# Patient Record
Sex: FEMALE | Race: WHITE | NOT HISPANIC OR LATINO | Employment: OTHER | URBAN - METROPOLITAN AREA
[De-identification: names, ages, dates, MRNs, and addresses within clinical notes are randomized per-mention and may not be internally consistent; named-entity substitution may affect disease eponyms.]

---

## 2023-02-09 PROBLEM — I50.30 DIASTOLIC CHF (MULTI): Status: ACTIVE | Noted: 2023-02-09

## 2023-02-09 PROBLEM — R30.0 DYSURIA: Status: ACTIVE | Noted: 2023-02-09

## 2023-02-09 PROBLEM — M24.811 INTERNAL DERANGEMENT OF RIGHT SHOULDER: Status: ACTIVE | Noted: 2023-02-09

## 2023-02-09 PROBLEM — I51.7 DILATED VENTRICLE: Status: ACTIVE | Noted: 2023-02-09

## 2023-02-09 PROBLEM — R53.83 FATIGUE: Status: ACTIVE | Noted: 2023-02-09

## 2023-02-09 PROBLEM — E66.812 CLASS 2 OBESITY WITH BODY MASS INDEX (BMI) OF 37.0 TO 37.9 IN ADULT: Status: ACTIVE | Noted: 2023-02-09

## 2023-02-09 PROBLEM — K83.8 INTRAHEPATIC BILE DUCT DILATION: Status: ACTIVE | Noted: 2023-02-09

## 2023-02-09 PROBLEM — N39.0 ACUTE UTI: Status: ACTIVE | Noted: 2023-02-09

## 2023-02-09 PROBLEM — K64.9 HEMORRHOIDS: Status: ACTIVE | Noted: 2023-02-09

## 2023-02-09 PROBLEM — L25.9 DERMATITIS VENENATA: Status: RESOLVED | Noted: 2023-02-09 | Resolved: 2023-02-09

## 2023-02-09 PROBLEM — I67.4 HYPERTENSIVE ENCEPHALOPATHY: Status: ACTIVE | Noted: 2023-02-09

## 2023-02-09 PROBLEM — R19.7 DIARRHEA: Status: ACTIVE | Noted: 2023-02-09

## 2023-02-09 PROBLEM — L23.5 ALLERGIC CONTACT DERMATITIS DUE TO CHEMICAL: Status: ACTIVE | Noted: 2023-02-09

## 2023-02-09 PROBLEM — R15.9 BOWEL INCONTINENCE: Status: ACTIVE | Noted: 2023-02-09

## 2023-02-09 PROBLEM — G47.00 INSOMNIA: Status: ACTIVE | Noted: 2023-02-09

## 2023-02-09 PROBLEM — R11.2 NAUSEA AND VOMITING: Status: ACTIVE | Noted: 2023-02-09

## 2023-02-09 PROBLEM — M17.11 OSTEOARTHRITIS OF RIGHT KNEE: Status: ACTIVE | Noted: 2023-02-09

## 2023-02-09 PROBLEM — H81.10 BENIGN POSITIONAL VERTIGO: Status: ACTIVE | Noted: 2023-02-09

## 2023-02-09 PROBLEM — E78.5 HYPERLIPIDEMIA: Status: ACTIVE | Noted: 2023-02-09

## 2023-02-09 PROBLEM — L30.9 ECZEMA: Status: ACTIVE | Noted: 2023-02-09

## 2023-02-09 PROBLEM — I48.91 ATRIAL FIBRILLATION (MULTI): Status: ACTIVE | Noted: 2023-02-09

## 2023-02-09 PROBLEM — K59.00 CONSTIPATION: Status: ACTIVE | Noted: 2023-02-09

## 2023-02-09 PROBLEM — R10.9 ACUTE ABDOMINAL PAIN: Status: ACTIVE | Noted: 2023-02-09

## 2023-02-09 PROBLEM — D64.9 ANEMIA: Status: ACTIVE | Noted: 2023-02-09

## 2023-02-09 PROBLEM — M60.9 MYOSITIS: Status: ACTIVE | Noted: 2023-02-09

## 2023-02-09 PROBLEM — S41.119A SKIN TEAR OF UPPER EXTREMITY: Status: ACTIVE | Noted: 2023-02-09

## 2023-02-09 PROBLEM — K43.0 INCARCERATED INCISIONAL HERNIA: Status: ACTIVE | Noted: 2023-02-09

## 2023-02-09 PROBLEM — B37.9 YEAST INFECTION: Status: ACTIVE | Noted: 2023-02-09

## 2023-02-09 PROBLEM — R11.0 NAUSEA: Status: ACTIVE | Noted: 2023-02-09

## 2023-02-09 PROBLEM — M50.30 DDD (DEGENERATIVE DISC DISEASE), CERVICAL: Status: ACTIVE | Noted: 2023-02-09

## 2023-02-09 PROBLEM — J01.90 ACUTE SINUSITIS: Status: ACTIVE | Noted: 2023-02-09

## 2023-02-09 PROBLEM — I10 ESSENTIAL HYPERTENSION: Status: ACTIVE | Noted: 2023-02-09

## 2023-02-09 PROBLEM — N39.0 UTI (URINARY TRACT INFECTION): Status: ACTIVE | Noted: 2023-02-09

## 2023-02-09 PROBLEM — K62.5 RECTAL BLEEDING: Status: ACTIVE | Noted: 2023-02-09
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
## 2023-02-09 PROBLEM — M25.562 LEFT KNEE PAIN: Status: ACTIVE | Noted: 2023-02-09

## 2023-02-09 PROBLEM — R10.30 LOWER ABDOMINAL PAIN: Status: ACTIVE | Noted: 2023-02-09

## 2023-02-09 PROBLEM — E66.9 CLASS 2 OBESITY WITH BODY MASS INDEX (BMI) OF 37.0 TO 37.9 IN ADULT: Status: ACTIVE | Noted: 2023-02-09

## 2023-02-09 PROBLEM — L25.9 DERMATITIS VENENATA: Status: ACTIVE | Noted: 2023-02-09

## 2023-02-09 PROBLEM — E66.01 MORBID OBESITY (MULTI): Status: ACTIVE | Noted: 2023-02-09

## 2023-02-09 PROBLEM — S49.91XA RIGHT SHOULDER INJURY: Status: ACTIVE | Noted: 2023-02-09

## 2023-02-09 PROBLEM — K57.92 DVTRCLI OF INTEST, PART UNSP, W/O PERF OR ABSCESS W/O BLEED: Status: ACTIVE | Noted: 2023-02-09

## 2023-02-09 PROBLEM — M75.02 ADHESIVE CAPSULITIS OF LEFT SHOULDER: Status: ACTIVE | Noted: 2023-02-09

## 2023-02-09 PROBLEM — R06.2 WHEEZING ON EXPIRATION: Status: ACTIVE | Noted: 2023-02-09

## 2023-02-09 PROBLEM — R05.9 COUGH: Status: ACTIVE | Noted: 2023-02-09

## 2023-02-09 PROBLEM — I10 HYPERTENSION: Status: ACTIVE | Noted: 2023-02-09

## 2023-02-09 PROBLEM — R60.0 LOWER LEG EDEMA: Status: ACTIVE | Noted: 2023-02-09

## 2023-02-09 PROBLEM — L30.4 INTERTRIGO: Status: ACTIVE | Noted: 2023-02-09

## 2023-02-09 PROBLEM — R42 LIGHTHEADEDNESS: Status: ACTIVE | Noted: 2023-02-09

## 2023-02-09 PROBLEM — K56.609 SBO (SMALL BOWEL OBSTRUCTION) (MULTI): Status: ACTIVE | Noted: 2023-02-09

## 2023-02-09 PROBLEM — R94.31 ABNORMAL EKG: Status: ACTIVE | Noted: 2023-02-09

## 2023-02-09 PROBLEM — M47.812 DJD (DEGENERATIVE JOINT DISEASE) OF CERVICAL SPINE: Status: ACTIVE | Noted: 2023-02-09

## 2023-02-09 PROBLEM — M15.9 GENERALIZED OSTEOARTHRITIS: Status: ACTIVE | Noted: 2023-02-09

## 2023-02-09 PROBLEM — B96.89 ACUTE BRONCHITIS, BACTERIAL: Status: ACTIVE | Noted: 2023-02-09

## 2023-02-09 PROBLEM — E66.9 OBESITY (BMI 30-39.9): Status: ACTIVE | Noted: 2023-02-09

## 2023-02-09 PROBLEM — J20.8 ACUTE BRONCHITIS, BACTERIAL: Status: ACTIVE | Noted: 2023-02-09

## 2023-02-09 PROBLEM — K62.3 PARTIAL RECTAL PROLAPSE: Status: ACTIVE | Noted: 2023-02-09

## 2023-02-09 PROBLEM — J44.9 COPD (CHRONIC OBSTRUCTIVE PULMONARY DISEASE) (MULTI): Status: ACTIVE | Noted: 2023-02-09

## 2023-02-09 PROBLEM — J20.9 ACUTE BRONCHITIS: Status: ACTIVE | Noted: 2023-02-09

## 2023-02-09 PROBLEM — R11.10 VOMITING: Status: ACTIVE | Noted: 2023-02-09

## 2023-02-09 PROBLEM — B35.6 TINEA CRURIS: Status: ACTIVE | Noted: 2023-02-09

## 2023-02-09 RX ORDER — PREDNISONE 20 MG/1
20 TABLET ORAL DAILY
COMMUNITY
End: 2023-12-18 | Stop reason: SDUPTHER

## 2023-02-09 RX ORDER — TIZANIDINE 4 MG/1
TABLET ORAL NIGHTLY
COMMUNITY
Start: 2017-06-22 | End: 2024-04-29 | Stop reason: SDUPTHER

## 2023-02-09 RX ORDER — METOPROLOL TARTRATE 25 MG/1
25 TABLET, FILM COATED ORAL 2 TIMES DAILY
COMMUNITY
Start: 2021-09-20 | End: 2023-04-10 | Stop reason: SDUPTHER

## 2023-02-09 RX ORDER — LATANOPROST 50 UG/ML
SOLUTION/ DROPS OPHTHALMIC
COMMUNITY
Start: 2019-04-22

## 2023-02-09 RX ORDER — FUROSEMIDE 20 MG/1
20 TABLET ORAL AS NEEDED
COMMUNITY
Start: 2021-10-15 | End: 2023-09-22 | Stop reason: SDUPTHER

## 2023-02-09 RX ORDER — ADHESIVE BANDAGE
BANDAGE TOPICAL
COMMUNITY
Start: 2021-01-19

## 2023-02-09 RX ORDER — OMEPRAZOLE 20 MG/1
20 TABLET, DELAYED RELEASE ORAL DAILY
COMMUNITY

## 2023-02-09 RX ORDER — HYDROCHLOROTHIAZIDE 25 MG/1
25 TABLET ORAL DAILY
COMMUNITY
Start: 2021-12-20 | End: 2023-09-22 | Stop reason: SDUPTHER

## 2023-02-09 RX ORDER — LISINOPRIL 40 MG/1
1 TABLET ORAL DAILY
COMMUNITY
Start: 2012-09-24 | End: 2024-01-15 | Stop reason: SDUPTHER

## 2023-02-09 RX ORDER — ALBUTEROL SULFATE 90 UG/1
2 AEROSOL, METERED RESPIRATORY (INHALATION) EVERY 4 HOURS PRN
COMMUNITY
Start: 2022-03-17

## 2023-02-09 RX ORDER — LACTULOSE 10 G/15ML
10 SOLUTION ORAL; RECTAL EVERY OTHER DAY
COMMUNITY
Start: 2020-08-05 | End: 2024-05-01 | Stop reason: SDUPTHER

## 2023-02-09 RX ORDER — AMLODIPINE BESYLATE 10 MG/1
10 TABLET ORAL DAILY
COMMUNITY
Start: 2019-08-13 | End: 2023-06-13 | Stop reason: SDUPTHER

## 2023-02-09 RX ORDER — TRIAMCINOLONE ACETONIDE 1 MG/G
CREAM TOPICAL 3 TIMES DAILY
COMMUNITY
Start: 2015-07-09 | End: 2023-12-29 | Stop reason: SDUPTHER

## 2023-02-09 RX ORDER — FLUTICASONE FUROATE AND VILANTEROL 100; 25 UG/1; UG/1
1 POWDER RESPIRATORY (INHALATION) DAILY
COMMUNITY

## 2023-02-09 RX ORDER — ROSUVASTATIN CALCIUM 20 MG/1
20 TABLET, COATED ORAL DAILY
COMMUNITY
Start: 2016-05-06 | End: 2023-06-13 | Stop reason: SDUPTHER

## 2023-02-09 RX ORDER — POLYETHYLENE GLYCOL 3350 17 G/17G
POWDER, FOR SOLUTION ORAL
COMMUNITY
Start: 2020-07-30

## 2023-03-22 ENCOUNTER — LAB (OUTPATIENT)
Dept: LAB | Facility: LAB | Age: 83
End: 2023-03-22
Payer: MEDICARE

## 2023-03-22 ENCOUNTER — OFFICE VISIT (OUTPATIENT)
Dept: PRIMARY CARE | Facility: CLINIC | Age: 83
End: 2023-03-22
Payer: MEDICARE

## 2023-03-22 VITALS
BODY MASS INDEX: 37.74 KG/M2 | WEIGHT: 213 LBS | HEART RATE: 84 BPM | OXYGEN SATURATION: 97 % | SYSTOLIC BLOOD PRESSURE: 115 MMHG | HEIGHT: 63 IN | DIASTOLIC BLOOD PRESSURE: 76 MMHG

## 2023-03-22 DIAGNOSIS — R73.9 ELEVATED BLOOD SUGAR: ICD-10-CM

## 2023-03-22 DIAGNOSIS — I48.20 CHRONIC ATRIAL FIBRILLATION (MULTI): ICD-10-CM

## 2023-03-22 DIAGNOSIS — J44.9 CHRONIC OBSTRUCTIVE PULMONARY DISEASE, UNSPECIFIED COPD TYPE (MULTI): ICD-10-CM

## 2023-03-22 DIAGNOSIS — I50.32 CHRONIC DIASTOLIC CONGESTIVE HEART FAILURE (MULTI): ICD-10-CM

## 2023-03-22 DIAGNOSIS — I10 HYPERTENSION, UNSPECIFIED TYPE: ICD-10-CM

## 2023-03-22 DIAGNOSIS — E66.01 MORBID OBESITY (MULTI): ICD-10-CM

## 2023-03-22 DIAGNOSIS — E78.5 HYPERLIPIDEMIA, UNSPECIFIED HYPERLIPIDEMIA TYPE: ICD-10-CM

## 2023-03-22 DIAGNOSIS — I73.9 PERIPHERAL VASCULAR DISEASE (CMS-HCC): Primary | ICD-10-CM

## 2023-03-22 PROBLEM — S41.119A SKIN TEAR OF UPPER EXTREMITY: Status: RESOLVED | Noted: 2023-02-09 | Resolved: 2023-03-22

## 2023-03-22 PROBLEM — K59.00 CONSTIPATION: Status: RESOLVED | Noted: 2023-02-09 | Resolved: 2023-03-22

## 2023-03-22 PROBLEM — M60.9 MYOSITIS: Status: RESOLVED | Noted: 2023-02-09 | Resolved: 2023-03-22

## 2023-03-22 PROBLEM — L30.9 ECZEMA: Status: RESOLVED | Noted: 2023-02-09 | Resolved: 2023-03-22

## 2023-03-22 PROBLEM — G47.00 INSOMNIA: Status: RESOLVED | Noted: 2023-02-09 | Resolved: 2023-03-22

## 2023-03-22 PROBLEM — R11.2 NAUSEA AND VOMITING: Status: RESOLVED | Noted: 2023-02-09 | Resolved: 2023-03-22

## 2023-03-22 PROBLEM — J01.90 ACUTE SINUSITIS: Status: RESOLVED | Noted: 2023-02-09 | Resolved: 2023-03-22

## 2023-03-22 PROBLEM — K56.609 SBO (SMALL BOWEL OBSTRUCTION) (MULTI): Status: RESOLVED | Noted: 2023-02-09 | Resolved: 2023-03-22

## 2023-03-22 PROBLEM — E66.812 CLASS 2 OBESITY WITH BODY MASS INDEX (BMI) OF 37.0 TO 37.9 IN ADULT: Status: RESOLVED | Noted: 2023-02-09 | Resolved: 2023-03-22

## 2023-03-22 PROBLEM — R11.10 VOMITING: Status: RESOLVED | Noted: 2023-02-09 | Resolved: 2023-03-22

## 2023-03-22 PROBLEM — K62.3 PARTIAL RECTAL PROLAPSE: Status: RESOLVED | Noted: 2023-02-09 | Resolved: 2023-03-22

## 2023-03-22 PROBLEM — R15.9 BOWEL INCONTINENCE: Status: RESOLVED | Noted: 2023-02-09 | Resolved: 2023-03-22

## 2023-03-22 PROBLEM — I67.4 HYPERTENSIVE ENCEPHALOPATHY: Status: RESOLVED | Noted: 2023-02-09 | Resolved: 2023-03-22

## 2023-03-22 PROBLEM — R30.0 DYSURIA: Status: RESOLVED | Noted: 2023-02-09 | Resolved: 2023-03-22

## 2023-03-22 PROBLEM — K83.8 INTRAHEPATIC BILE DUCT DILATION: Status: RESOLVED | Noted: 2023-02-09 | Resolved: 2023-03-22

## 2023-03-22 PROBLEM — B37.9 YEAST INFECTION: Status: RESOLVED | Noted: 2023-02-09 | Resolved: 2023-03-22

## 2023-03-22 PROBLEM — R60.0 LOWER LEG EDEMA: Status: RESOLVED | Noted: 2023-02-09 | Resolved: 2023-03-22

## 2023-03-22 PROBLEM — N39.0 ACUTE UTI: Status: RESOLVED | Noted: 2023-02-09 | Resolved: 2023-03-22

## 2023-03-22 PROBLEM — M47.812 DJD (DEGENERATIVE JOINT DISEASE) OF CERVICAL SPINE: Status: RESOLVED | Noted: 2023-02-09 | Resolved: 2023-03-22

## 2023-03-22 PROBLEM — E66.9 CLASS 2 OBESITY WITH BODY MASS INDEX (BMI) OF 37.0 TO 37.9 IN ADULT: Status: RESOLVED | Noted: 2023-02-09 | Resolved: 2023-03-22

## 2023-03-22 PROBLEM — K57.92 DVTRCLI OF INTEST, PART UNSP, W/O PERF OR ABSCESS W/O BLEED: Status: RESOLVED | Noted: 2023-02-09 | Resolved: 2023-03-22

## 2023-03-22 PROBLEM — R06.2 WHEEZING ON EXPIRATION: Status: RESOLVED | Noted: 2023-02-09 | Resolved: 2023-03-22

## 2023-03-22 PROBLEM — L30.4 INTERTRIGO: Status: RESOLVED | Noted: 2023-02-09 | Resolved: 2023-03-22

## 2023-03-22 PROBLEM — R10.9 ACUTE ABDOMINAL PAIN: Status: RESOLVED | Noted: 2023-02-09 | Resolved: 2023-03-22

## 2023-03-22 PROBLEM — H81.10 BENIGN POSITIONAL VERTIGO: Status: RESOLVED | Noted: 2023-02-09 | Resolved: 2023-03-22

## 2023-03-22 PROBLEM — R94.31 ABNORMAL EKG: Status: RESOLVED | Noted: 2023-02-09 | Resolved: 2023-03-22

## 2023-03-22 PROBLEM — B35.6 TINEA CRURIS: Status: RESOLVED | Noted: 2023-02-09 | Resolved: 2023-03-22

## 2023-03-22 PROBLEM — K62.5 RECTAL BLEEDING: Status: RESOLVED | Noted: 2023-02-09 | Resolved: 2023-03-22

## 2023-03-22 PROBLEM — E66.9 OBESITY (BMI 30-39.9): Status: RESOLVED | Noted: 2023-02-09 | Resolved: 2023-03-22

## 2023-03-22 PROBLEM — D64.9 ANEMIA: Status: RESOLVED | Noted: 2023-02-09 | Resolved: 2023-03-22

## 2023-03-22 PROBLEM — R19.7 DIARRHEA: Status: RESOLVED | Noted: 2023-02-09 | Resolved: 2023-03-22

## 2023-03-22 PROBLEM — J20.8 ACUTE BRONCHITIS, BACTERIAL: Status: RESOLVED | Noted: 2023-02-09 | Resolved: 2023-03-22

## 2023-03-22 PROBLEM — S49.91XA RIGHT SHOULDER INJURY: Status: RESOLVED | Noted: 2023-02-09 | Resolved: 2023-03-22

## 2023-03-22 PROBLEM — R42 LIGHTHEADEDNESS: Status: RESOLVED | Noted: 2023-02-09 | Resolved: 2023-03-22

## 2023-03-22 PROBLEM — N39.0 UTI (URINARY TRACT INFECTION): Status: RESOLVED | Noted: 2023-02-09 | Resolved: 2023-03-22

## 2023-03-22 PROBLEM — M25.562 LEFT KNEE PAIN: Status: RESOLVED | Noted: 2023-02-09 | Resolved: 2023-03-22

## 2023-03-22 PROBLEM — R10.30 LOWER ABDOMINAL PAIN: Status: RESOLVED | Noted: 2023-02-09 | Resolved: 2023-03-22

## 2023-03-22 PROBLEM — M24.811 INTERNAL DERANGEMENT OF RIGHT SHOULDER: Status: RESOLVED | Noted: 2023-02-09 | Resolved: 2023-03-22

## 2023-03-22 PROBLEM — J20.9 ACUTE BRONCHITIS: Status: RESOLVED | Noted: 2023-02-09 | Resolved: 2023-03-22

## 2023-03-22 PROBLEM — L23.5 ALLERGIC CONTACT DERMATITIS DUE TO CHEMICAL: Status: RESOLVED | Noted: 2023-02-09 | Resolved: 2023-03-22

## 2023-03-22 PROBLEM — I51.7 DILATED VENTRICLE: Status: RESOLVED | Noted: 2023-02-09 | Resolved: 2023-03-22

## 2023-03-22 PROBLEM — M17.11 OSTEOARTHRITIS OF RIGHT KNEE: Status: RESOLVED | Noted: 2023-02-09 | Resolved: 2023-03-22
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
## 2023-03-22 PROBLEM — B96.89 ACUTE BRONCHITIS, BACTERIAL: Status: RESOLVED | Noted: 2023-02-09 | Resolved: 2023-03-22

## 2023-03-22 PROBLEM — K43.0 INCARCERATED INCISIONAL HERNIA: Status: RESOLVED | Noted: 2023-02-09 | Resolved: 2023-03-22

## 2023-03-22 PROBLEM — R11.0 NAUSEA: Status: RESOLVED | Noted: 2023-02-09 | Resolved: 2023-03-22

## 2023-03-22 PROBLEM — M75.02 ADHESIVE CAPSULITIS OF LEFT SHOULDER: Status: RESOLVED | Noted: 2023-02-09 | Resolved: 2023-03-22

## 2023-03-22 PROBLEM — R05.9 COUGH: Status: RESOLVED | Noted: 2023-02-09 | Resolved: 2023-03-22

## 2023-03-22 PROBLEM — R53.83 FATIGUE: Status: RESOLVED | Noted: 2023-02-09 | Resolved: 2023-03-22

## 2023-03-22 LAB
ALANINE AMINOTRANSFERASE (SGPT) (U/L) IN SER/PLAS: 12 U/L (ref 7–45)
ALBUMIN (G/DL) IN SER/PLAS: 4.4 G/DL (ref 3.4–5)
ALKALINE PHOSPHATASE (U/L) IN SER/PLAS: 78 U/L (ref 33–136)
ANION GAP IN SER/PLAS: 15 MMOL/L (ref 10–20)
ASPARTATE AMINOTRANSFERASE (SGOT) (U/L) IN SER/PLAS: 18 U/L (ref 9–39)
BILIRUBIN TOTAL (MG/DL) IN SER/PLAS: 0.6 MG/DL (ref 0–1.2)
CALCIUM (MG/DL) IN SER/PLAS: 10.2 MG/DL (ref 8.6–10.6)
CARBON DIOXIDE, TOTAL (MMOL/L) IN SER/PLAS: 30 MMOL/L (ref 21–32)
CHLORIDE (MMOL/L) IN SER/PLAS: 101 MMOL/L (ref 98–107)
CHOLESTEROL (MG/DL) IN SER/PLAS: 120 MG/DL (ref 0–199)
CHOLESTEROL IN HDL (MG/DL) IN SER/PLAS: 64.9 MG/DL
CHOLESTEROL/HDL RATIO: 1.8
CREATININE (MG/DL) IN SER/PLAS: 0.84 MG/DL (ref 0.5–1.05)
ERYTHROCYTE DISTRIBUTION WIDTH (RATIO) BY AUTOMATED COUNT: 13.3 % (ref 11.5–14.5)
ERYTHROCYTE MEAN CORPUSCULAR HEMOGLOBIN CONCENTRATION (G/DL) BY AUTOMATED: 31.9 G/DL (ref 32–36)
ERYTHROCYTE MEAN CORPUSCULAR VOLUME (FL) BY AUTOMATED COUNT: 94 FL (ref 80–100)
ERYTHROCYTES (10*6/UL) IN BLOOD BY AUTOMATED COUNT: 3.93 X10E12/L (ref 4–5.2)
ESTIMATED AVERAGE GLUCOSE FOR HBA1C: 105 MG/DL
GFR FEMALE: 69 ML/MIN/1.73M2
GLUCOSE (MG/DL) IN SER/PLAS: 95 MG/DL (ref 74–99)
HEMATOCRIT (%) IN BLOOD BY AUTOMATED COUNT: 37 % (ref 36–46)
HEMOGLOBIN (G/DL) IN BLOOD: 11.8 G/DL (ref 12–16)
HEMOGLOBIN A1C/HEMOGLOBIN TOTAL IN BLOOD: 5.3 %
LDL: 41 MG/DL (ref 0–99)
LEUKOCYTES (10*3/UL) IN BLOOD BY AUTOMATED COUNT: 9.1 X10E9/L (ref 4.4–11.3)
NRBC (PER 100 WBCS) BY AUTOMATED COUNT: 0 /100 WBC (ref 0–0)
PLATELETS (10*3/UL) IN BLOOD AUTOMATED COUNT: 277 X10E9/L (ref 150–450)
POTASSIUM (MMOL/L) IN SER/PLAS: 4.5 MMOL/L (ref 3.5–5.3)
PROTEIN TOTAL: 6.7 G/DL (ref 6.4–8.2)
SODIUM (MMOL/L) IN SER/PLAS: 141 MMOL/L (ref 136–145)
THYROTROPIN (MIU/L) IN SER/PLAS BY DETECTION LIMIT <= 0.05 MIU/L: 1.79 MIU/L (ref 0.44–3.98)
TRIGLYCERIDE (MG/DL) IN SER/PLAS: 70 MG/DL (ref 0–149)
UREA NITROGEN (MG/DL) IN SER/PLAS: 29 MG/DL (ref 6–23)
VLDL: 14 MG/DL (ref 0–40)

## 2023-03-22 PROCEDURE — 36415 COLL VENOUS BLD VENIPUNCTURE: CPT

## 2023-03-22 PROCEDURE — 80053 COMPREHEN METABOLIC PANEL: CPT

## 2023-03-22 PROCEDURE — 84443 ASSAY THYROID STIM HORMONE: CPT

## 2023-03-22 PROCEDURE — 80061 LIPID PANEL: CPT

## 2023-03-22 PROCEDURE — 1160F RVW MEDS BY RX/DR IN RCRD: CPT | Performed by: INTERNAL MEDICINE

## 2023-03-22 PROCEDURE — 3074F SYST BP LT 130 MM HG: CPT | Performed by: INTERNAL MEDICINE

## 2023-03-22 PROCEDURE — 83036 HEMOGLOBIN GLYCOSYLATED A1C: CPT

## 2023-03-22 PROCEDURE — 1159F MED LIST DOCD IN RCRD: CPT | Performed by: INTERNAL MEDICINE

## 2023-03-22 PROCEDURE — 99214 OFFICE O/P EST MOD 30 MIN: CPT | Performed by: INTERNAL MEDICINE

## 2023-03-22 PROCEDURE — 3078F DIAST BP <80 MM HG: CPT | Performed by: INTERNAL MEDICINE

## 2023-03-22 PROCEDURE — 1036F TOBACCO NON-USER: CPT | Performed by: INTERNAL MEDICINE

## 2023-03-22 PROCEDURE — 85027 COMPLETE CBC AUTOMATED: CPT

## 2023-03-22 RX ORDER — FLUTICASONE FUROATE, UMECLIDINIUM BROMIDE AND VILANTEROL TRIFENATATE 100; 62.5; 25 UG/1; UG/1; UG/1
1 POWDER RESPIRATORY (INHALATION) DAILY
Qty: 1 EACH | Refills: 11 | Status: SHIPPED | OUTPATIENT
Start: 2023-03-22 | End: 2024-04-09 | Stop reason: SDUPTHER

## 2023-03-22 NOTE — PROGRESS NOTES
Subjective   Patient ID: Caridad Rodriguez is a 83 y.o. female who presents for the following    Medicare wellness 2022  Assessment/Plan   COPD: spirometry  FEV1 49%, recommend once a day breo    she has a nebulizer at home       diastolic chf : stable, continue 20mg lasix daily and continue on hctz 25mg daily      dry weight is 210lbs, currently patient is 213lbs ( 20mg lasix daily --> TID dosing until 210lbs)     morbid obesity: stable, recommend diet and weight loss     a-fib: continue on xeralto. stable, rate controlled on metoprolol      anemia noted hgb 9-10 check iron studies b12 folate and tsh (she denies any area of bleeding)       HPI   female with hemorrhoids, htn, hld, bilateral tka, hx sbo 2021 & 2022, a-fib (on xarelto), diastolic CHF wants to follow up for         bronchitis vs COPD: patient  doing well. Has as needed inhaler      diastolic chf/ bilateral lower extremity edema: she has currently taken 20mg lasix daily. patient is on lisinopril 40mg daily, amlodipine 10mg daily.       A-fib: patient is now on metoprolol 25mg bid and xarelto 20mg daily. echo 2021 shows normal ef with elevated RVSP. she has mild bruising on her right forearm but no bleeding from xeralto      NO BLEEDING Colonoscopy: Took cologuard at home which was positive, Coloscopy done by Dr. Peña, states she doesn't need another colonoscopy now for the rest of her life     Mammogram: 6-7 year ago, no family hx of breast cancer     patient has great granddaughters      family medical history: both mom and dad  DM and stroke, brother  lung ca      surgery: open incisional hernia repair May 17, 2022 Dr Orona      HOSPITALIZATIONS   [facility] 2022 - 2022 for small bowel obstruction that resolved within the day. months later she had her hernia repaired in May 2022.       [facility]  to 2022 for severe diarrhea. abd/pelvic ct shows SBO. labs show uti. patient improved quicker  "then expected. she is sent with cipro going home and she is to follow up with general surgery for hernia repair    Visit Vitals  /76   Pulse 84   Ht 1.6 m (5' 3\")   Wt 96.6 kg (213 lb)   SpO2 97%   BMI 37.73 kg/m²   Smoking Status Former   BSA 2.07 m²     PHYSICAL EXAM       REVIEW OF SYSTEMS       Allergies   Allergen Reactions    Adhesive Tape-Silicones Unknown    Latex Unknown    Metronidazole Unknown       Current Outpatient Medications   Medication Sig Dispense Refill    albuterol (Ventolin HFA) 90 mcg/actuation inhaler Inhale 2 puffs every 4 (four) hours if needed.      amLODIPine (Norvasc) 10 mg tablet Take 1 tablet (10 mg) by mouth 1 (one) time each day.      aspirin 81 mg capsule Take 1 tablet by mouth 1 (one) time each day.      fluticasone furoate-vilanteroL (Breo Ellipta) 100-25 mcg/dose inhaler Inhale 1 puff in the morning. After inhalation rinse mouth with water and spit. Use same time each day, no more than once in 24 hours.      furosemide (Lasix) 20 mg tablet Take 1 tablet (20 mg) by mouth 1 (one) time each day.      hydroCHLOROthiazide (HYDRODiuril) 25 mg tablet Take 1 tablet (25 mg) by mouth 1 (one) time each day.      lactulose 20 gram/30 mL oral solution Take 15 mL (10 g) by mouth every other day.      latanoprost (Xalatan) 0.005 % ophthalmic solution Latanoprost 0.005 % Ophthalmic Solution   Refills: 0        Start : 22-Apr-2019   Active  2.5 ML Bottle      lisinopril 40 mg tablet Take 1 tablet (40 mg) by mouth 1 (one) time each day.      magnesium hydroxide (Milk of Magnesia) 400 mg/5 mL suspension Milk of Magnesia 400 MG/5ML Oral Suspension   Refills: 0        Start : 19-Jan-2021   Active      metoprolol tartrate (Lopressor) 25 mg tablet Take 1 tablet (25 mg) by mouth in the morning and at bedtime.      omeprazole OTC (PriLOSEC OTC) 20 mg EC tablet Take 1 tablet (20 mg) by mouth 1 (one) time each day. Do not crush, chew, or split. (While on steroids)      polyethylene glycol (Miralax) " 17 gram/dose powder MiraLax 17 GM/SCOOP Oral Powder   Refills: 0        Start : 30-Jul-2020   Active      predniSONE (Deltasone) 20 mg tablet Take 1 tablet (20 mg) by mouth 1 (one) time each day. Take 2 tablets for 5 days, then 1 tablet for 5 days for COPD exacerbation      rivaroxaban (Xarelto) 20 mg tablet Take 1 tablet (20 mg) by mouth 1 (one) time each day.      rosuvastatin (Crestor) 20 mg tablet Take 1 tablet (20 mg) by mouth 1 (one) time each day.      tiZANidine (Zanaflex) 4 mg tablet at bedtime. 1 tab po at night before sleep      triamcinolone (Kenalog) 0.1 % cream 3 times a day. APPLY SPARINGLY TO AFFECTED AREA(S) 3 TIMES A DAY       No current facility-administered medications for this visit.       Objective     No visits with results within 4 Month(s) from this visit.   Latest known visit with results is:   Legacy Encounter on 10/18/2022   Component Date Value Ref Range Status    WBC, Urine 10/18/2022 928 (A)  0 - 5 /HPF Final    WBC Clumps, Urine 10/18/2022 OCC  /HPF Final    RBC, Urine 10/18/2022 73 (A)  0 - 5 /HPF Final    Squamous Epithelial Cells, Urine 10/18/2022 3  /HPF Final    Bacteria, Urine 10/18/2022 2+ (A)  /HPF Final    Mucus, Urine 10/18/2022 1+  /LPF Final    Color, Urine 10/18/2022 YELLOW  STRAW,YELLOW Final    Appearance, Urine 10/18/2022 HAZY  CLEAR Final    Specific Gravity, Urine 10/18/2022 1.011  1.005 - 1.035 Final    pH, Urine 10/18/2022 5.0  5.0 - 8.0 Final    Protein, Urine 10/18/2022 30 (1+) (A)  NEGATIVE mg/dL Final    Glucose, Urine 10/18/2022 NEGATIVE  NEGATIVE mg/dL Final    Blood, Urine 10/18/2022 LARGE (3+) (A)  NEGATIVE Final    Ketones, Urine 10/18/2022 NEGATIVE  NEGATIVE mg/dL Final    Bilirubin, Urine 10/18/2022 NEGATIVE  NEGATIVE Final    Urobilinogen, Urine 10/18/2022 <2.0  0.0 - 1.9 mg/dL Final    Nitrite, Urine 10/18/2022 NEGATIVE  NEGATIVE Final    Leukocyte Esterase, Urine 10/18/2022 LARGE (3+) (A)  NEGATIVE Final       Radiology: Reviewed imaging in  powerchart.  No results found.    Family History   Problem Relation Name Age of Onset    Hypertension Mother      Stroke Mother      Hypertension Father      Stroke Father      Lung cancer Brother       Social History     Socioeconomic History    Marital status:      Spouse name: None    Number of children: None    Years of education: None    Highest education level: None   Occupational History    None   Tobacco Use    Smoking status: Former     Types: Cigarettes    Smokeless tobacco: Former   Vaping Use    Vaping status: None   Substance and Sexual Activity    Alcohol use: None    Drug use: None    Sexual activity: None   Other Topics Concern    None   Social History Narrative    None     Social Determinants of Health     Financial Resource Strain: Not on file   Food Insecurity: Not on file   Transportation Needs: Not on file   Physical Activity: Not on file   Stress: Not on file   Social Connections: Not on file   Intimate Partner Violence: Not on file   Housing Stability: Not on file     Past Medical History:   Diagnosis Date    Diverticulitis of intestine, part unspecified, without perforation or abscess without bleeding 05/30/2019    Dvtrcli of intest, part unsp, w/o perf or abscess w/o bleed    Other conditions influencing health status 05/30/2019    Cystocele    Personal history of other diseases of the circulatory system     History of hypertension    Personal history of other diseases of the musculoskeletal system and connective tissue     History of osteoarthritis    Personal history of other diseases of the nervous system and sense organs     History of carpal tunnel syndrome    Personal history of urinary (tract) infections 10/10/2017    History of urinary tract infection     Past Surgical History:   Procedure Laterality Date    APPENDECTOMY  05/30/2019    Appendectomy    CHOLECYSTECTOMY  05/30/2019    Cholecystectomy    COLON SURGERY  05/30/2019    Colon Surgery    HERNIA REPAIR  05/30/2019     Hernia Repair    LUNG SURGERY  05/30/2019    Lung Surgery    MR HEAD ANGIO WO IV CONTRAST  4/23/2016    MR HEAD ANGIO WO IV CONTRAST 4/23/2016 [facility] INPATIENT LEGACY    OTHER SURGICAL HISTORY  05/30/2019    Excision of basal cell carcinoma    OTHER SURGICAL HISTORY  05/30/2019    Hemorrhoidectomy    TONSILLECTOMY  05/30/2019    Tonsillectomy    TOTAL HIP ARTHROPLASTY  05/30/2019    Hip Replacement    TOTAL KNEE ARTHROPLASTY  05/30/2019    Knee Replacement       Charting was completed using voice recognition technology and may include unintended errors.

## 2023-03-27 LAB — FEV1: NORMAL LITERS

## 2023-04-10 DIAGNOSIS — I10 HYPERTENSION, UNSPECIFIED TYPE: ICD-10-CM

## 2023-04-10 RX ORDER — METOPROLOL TARTRATE 25 MG/1
25 TABLET, FILM COATED ORAL 2 TIMES DAILY
Qty: 180 TABLET | Refills: 1 | Status: SHIPPED | OUTPATIENT
Start: 2023-04-10 | End: 2023-10-23 | Stop reason: SDUPTHER

## 2023-04-26 ENCOUNTER — TELEPHONE (OUTPATIENT)
Dept: PRIMARY CARE | Facility: CLINIC | Age: 83
End: 2023-04-26

## 2023-04-26 NOTE — TELEPHONE ENCOUNTER
PATIENT WAS SEEN BY SHANT ( ORTHO )TODAY AND HE TOLD THE PATIENT HE DID NOT HAVE ANYONE HE COULD REFER HER TO FOR HER HIP THAT EVERYONE IS BOOKING 4 MONTHS OUT.  PATIENT IS ASKING IF DR MICHAEL WILL REFER HER TO SOMEONE ELSE PLEASE  THANK YOU

## 2023-05-17 ENCOUNTER — OFFICE VISIT (OUTPATIENT)
Dept: PRIMARY CARE | Facility: CLINIC | Age: 83
End: 2023-05-17
Payer: MEDICARE

## 2023-05-17 VITALS
OXYGEN SATURATION: 94 % | WEIGHT: 215 LBS | DIASTOLIC BLOOD PRESSURE: 73 MMHG | HEIGHT: 64 IN | HEART RATE: 66 BPM | BODY MASS INDEX: 36.7 KG/M2 | SYSTOLIC BLOOD PRESSURE: 110 MMHG

## 2023-05-17 DIAGNOSIS — I95.9 HYPOTENSION, UNSPECIFIED HYPOTENSION TYPE: ICD-10-CM

## 2023-05-17 DIAGNOSIS — I48.91 ATRIAL FIBRILLATION, UNSPECIFIED TYPE (MULTI): ICD-10-CM

## 2023-05-17 DIAGNOSIS — I50.30 DIASTOLIC CONGESTIVE HEART FAILURE, UNSPECIFIED HF CHRONICITY (MULTI): Primary | ICD-10-CM

## 2023-05-17 DIAGNOSIS — I10 PRIMARY HYPERTENSION: ICD-10-CM

## 2023-05-17 PROCEDURE — 3078F DIAST BP <80 MM HG: CPT | Performed by: EMERGENCY MEDICINE

## 2023-05-17 PROCEDURE — 3074F SYST BP LT 130 MM HG: CPT | Performed by: EMERGENCY MEDICINE

## 2023-05-17 PROCEDURE — 1159F MED LIST DOCD IN RCRD: CPT | Performed by: EMERGENCY MEDICINE

## 2023-05-17 PROCEDURE — 99213 OFFICE O/P EST LOW 20 MIN: CPT | Performed by: EMERGENCY MEDICINE

## 2023-05-17 PROCEDURE — 1160F RVW MEDS BY RX/DR IN RCRD: CPT | Performed by: EMERGENCY MEDICINE

## 2023-05-17 PROCEDURE — 1036F TOBACCO NON-USER: CPT | Performed by: EMERGENCY MEDICINE

## 2023-05-17 NOTE — PROGRESS NOTES
Subjective   Patient ID: Caridad Rodriguez is a 83 y.o. female who presents for Hypotension (Pt has been complaining of having low BP. ).    Assessment/Plan   Problem List Items Addressed This Visit          Circulatory    Diastolic CHF (CMS/HCC) - Primary     Other Visit Diagnoses       Hypotension, unspecified hypotension type              Hypotension- Continued metoprolol. Discontinue amlodipine and lisinopril. Continue home BP monitoring.   Counseled to monitor for dark or black stool.     CHF- continue lasix 20mg     Source of history: Nurse, Medical personnel, Medical record, Patient.  History limitation: None.    HPI  83 year old female for office visit     Presents for concern of hypotension. BP readings yesterday of 114/58 and 120/65. Patient with diarhea during the time in which reading were taken.         Denies bleed or dark/black stools.   This morning she only took metoprolol and held her other medications. BP in office is 110/73.     Allergies   Allergen Reactions    Adhesive Tape-Silicones Unknown    Chlorine Other    Keflex [Cephalexin] Other    Latex Unknown       Current Outpatient Medications   Medication Sig Dispense Refill    acetaminophen (TYLENOL ORAL) Take by mouth.      albuterol 90 mcg/actuation inhaler Inhale 2 puffs every 4 hours if needed.      amLODIPine (Norvasc) 10 mg tablet Take 1 tablet (10 mg) by mouth once daily.      aspirin 81 mg capsule Take 1 tablet by mouth 1 (one) time each day.      fluticasone furoate-vilanteroL (Breo Elipta) 100-25 mcg/dose inhaler Inhale 1 puff once daily. After inhalation rinse mouth with water and spit. Use same time each day, no more than once in 24 hours.      fluticasone-umeclidin-vilanter (Trelegy Ellipta) 100-62.5-25 mcg blister with device Inhale 1 puff once daily. 1 each 11    furosemide (Lasix) 20 mg tablet Take 1 tablet (20 mg) by mouth if needed.      hydroCHLOROthiazide (HYDRODiuril) 25 mg tablet Take 1 tablet (25 mg) by mouth once daily.       "latanoprost (Xalatan) 0.005 % ophthalmic solution Latanoprost 0.005 % Ophthalmic Solution   Refills: 0        Start : 22-Apr-2019   Active  2.5 ML Bottle      lisinopril 40 mg tablet Take 1 tablet (40 mg) by mouth once daily.      magnesium hydroxide (Milk of Magnesia) 400 mg/5 mL suspension Milk of Magnesia 400 MG/5ML Oral Suspension   Refills: 0        Start : 19-Jan-2021   Active      metoprolol tartrate (Lopressor) 25 mg tablet Take 1 tablet (25 mg) by mouth in the morning and 1 tablet (25 mg) before bedtime. 180 tablet 1    rivaroxaban (Xarelto) 20 mg tablet Take 1 tablet (20 mg) by mouth once daily.      rosuvastatin (Crestor) 20 mg tablet Take 1 tablet (20 mg) by mouth once daily.      TIMOLOL MALEATE OPHT Administer into affected eye(s).      tiZANidine (Zanaflex) 4 mg tablet at bedtime. 1 tab po at night before sleep      lactulose 20 gram/30 mL oral solution Take 15 mL (10 g) by mouth every other day.      omeprazole OTC (PriLOSEC OTC) 20 mg EC tablet Take 1 tablet (20 mg) by mouth once daily. Do not crush, chew, or split. (While on steroids)      polyethylene glycol (Glycolax) 17 gram/dose powder MiraLax 17 GM/SCOOP Oral Powder   Refills: 0        Start : 30-Jul-2020   Active      predniSONE (Deltasone) 20 mg tablet Take 1 tablet (20 mg) by mouth once daily. Take 2 tablets for 5 days, then 1 tablet for 5 days for COPD exacerbation      triamcinolone (Kenalog) 0.1 % cream 3 times a day. APPLY SPARINGLY TO AFFECTED AREA(S) 3 TIMES A DAY       No current facility-administered medications for this visit.       Objective   Visit Vitals  /73   Pulse 66   Ht 1.626 m (5' 4\")   Wt 97.5 kg (215 lb)   SpO2 94%   BMI 36.90 kg/m²   Smoking Status Former   BSA 2.1 m²     Physical Exam  Vital signs as per nursing/MA documentation   General appearance: Alert and in no acute distress  HEENT: Normal Inspection   Neck: Normal Inspection   Respiratory: No respiratory distress Lungs are clear   Cardiovascular: Heart " rate normal. No gallop  Back: Normal Inspection   Skin inspection: Warm   Musculoskeletal: No deformities   Neuro: Limited exam. Baseline    Review of Systems   Comprehensive review of systems as allowed by patient condition and nursing input is negative    Lab on 03/22/2023   Component Date Value Ref Range Status    WBC 03/22/2023 9.1  4.4 - 11.3 x10E9/L Final    nRBC 03/22/2023 0.0  0.0 - 0.0 /100 WBC Final    RBC 03/22/2023 3.93 (L)  4.00 - 5.20 x10E12/L Final    Hemoglobin 03/22/2023 11.8 (L)  12.0 - 16.0 g/dL Final    Hematocrit 03/22/2023 37.0  36.0 - 46.0 % Final    MCV 03/22/2023 94  80 - 100 fL Final    MCHC 03/22/2023 31.9 (L)  32.0 - 36.0 g/dL Final    Platelets 03/22/2023 277  150 - 450 x10E9/L Final    RDW 03/22/2023 13.3  11.5 - 14.5 % Final    Glucose 03/22/2023 95  74 - 99 mg/dL Final    Sodium 03/22/2023 141  136 - 145 mmol/L Final    Potassium 03/22/2023 4.5  3.5 - 5.3 mmol/L Final    Chloride 03/22/2023 101  98 - 107 mmol/L Final    Bicarbonate 03/22/2023 30  21 - 32 mmol/L Final    Anion Gap 03/22/2023 15  10 - 20 mmol/L Final    Urea Nitrogen 03/22/2023 29 (H)  6 - 23 mg/dL Final    Creatinine 03/22/2023 0.84  0.50 - 1.05 mg/dL Final    GFR Female 03/22/2023 69  >90 mL/min/1.73m2 Final    Calcium 03/22/2023 10.2  8.6 - 10.6 mg/dL Final    Albumin 03/22/2023 4.4  3.4 - 5.0 g/dL Final    Alkaline Phosphatase 03/22/2023 78  33 - 136 U/L Final    Total Protein 03/22/2023 6.7  6.4 - 8.2 g/dL Final    AST 03/22/2023 18  9 - 39 U/L Final    Total Bilirubin 03/22/2023 0.6  0.0 - 1.2 mg/dL Final    ALT (SGPT) 03/22/2023 12  7 - 45 U/L Final    Hemoglobin A1C 03/22/2023 5.3  % Final    Estimated Average Glucose 03/22/2023 105  MG/DL Final    Cholesterol 03/22/2023 120  0 - 199 mg/dL Final    HDL 03/22/2023 64.9  mg/dL Final    Cholesterol/HDL Ratio 03/22/2023 1.8   Final    LDL 03/22/2023 41  0 - 99 mg/dL Final    VLDL 03/22/2023 14  0 - 40 mg/dL Final    Triglycerides 03/22/2023 70  0 - 149 mg/dL Final     TSH 03/22/2023 1.79  0.44 - 3.98 mIU/L Final   Office Visit on 03/22/2023   Component Date Value Ref Range Status    FEV1 03/27/2023 gold II  liters Final       Radiology: Reviewed imaging in powerchart.  No results found.    Family History   Problem Relation Name Age of Onset    Hypertension Mother      Stroke Mother      Hypertension Father      Stroke Father      Lung cancer Brother       Social History     Socioeconomic History    Marital status:      Spouse name: None    Number of children: None    Years of education: None    Highest education level: None   Occupational History    None   Tobacco Use    Smoking status: Former     Types: Cigarettes    Smokeless tobacco: Former   Vaping Use    Vaping status: None   Substance and Sexual Activity    Alcohol use: Not Currently    Drug use: None    Sexual activity: None   Other Topics Concern    None   Social History Narrative    None     Social Determinants of Health     Financial Resource Strain: Not on file   Food Insecurity: Not on file   Transportation Needs: Not on file   Physical Activity: Not on file   Stress: Not on file   Social Connections: Not on file   Intimate Partner Violence: Not on file   Housing Stability: Not on file     Past Medical History:   Diagnosis Date    Diverticulitis of intestine, part unspecified, without perforation or abscess without bleeding 05/30/2019    Dvtrcli of intest, part unsp, w/o perf or abscess w/o bleed    Other conditions influencing health status 05/30/2019    Cystocele    Personal history of other diseases of the circulatory system     History of hypertension    Personal history of other diseases of the musculoskeletal system and connective tissue     History of osteoarthritis    Personal history of other diseases of the nervous system and sense organs     History of carpal tunnel syndrome    Personal history of urinary (tract) infections 10/10/2017    History of urinary tract infection     Past Surgical  History:   Procedure Laterality Date    APPENDECTOMY  05/30/2019    Appendectomy    CHOLECYSTECTOMY  05/30/2019    Cholecystectomy    COLON SURGERY  05/30/2019    Colon Surgery    HERNIA REPAIR  05/30/2019    Hernia Repair    LUNG SURGERY  05/30/2019    Lung Surgery    MR HEAD ANGIO WO IV CONTRAST  4/23/2016    MR HEAD ANGIO WO IV CONTRAST 4/23/2016 [facility] [place] INPATIENT LEGAC[facility]    OTHER SURGICAL HISTORY  05/30/2019    Excision of basal cell carcinoma    OTHER SURGICAL HISTORY  05/30/2019    Hemorrhoidectomy    TONSILLECTOMY  05/30/2019    Tonsillectomy    TOTAL HIP ARTHROPLASTY  05/30/2019    Hip Replacement    TOTAL KNEE ARTHROPLASTY  05/30/2019    Knee Replacement       Scribe Attestation  By signing my name below, I, Casper Flowers   attest that this documentation has been prepared under the direction and in the presence of Rashad Duncan MD.

## 2023-06-02 DIAGNOSIS — I48.91 ATRIAL FIBRILLATION, UNSPECIFIED TYPE (MULTI): Primary | ICD-10-CM

## 2023-06-02 NOTE — TELEPHONE ENCOUNTER
Patient called in reporting that she has been coughing and wheezing since Weds night. Last time when this happened she was given prednisone and Zpack. She has these medications on hand. Patient did a nebulizer treatment this morning which provided some relief from wheezing. Patient would like to know if she should start taking prednisone and the zpack.

## 2023-06-05 NOTE — TELEPHONE ENCOUNTER
She started prednisone and Zpak and is feeling better.  Should she get refills on the scripts to have on hand?  See previous note

## 2023-06-06 DIAGNOSIS — J44.9 CHRONIC OBSTRUCTIVE PULMONARY DISEASE, UNSPECIFIED COPD TYPE (MULTI): Primary | ICD-10-CM

## 2023-06-06 RX ORDER — METHYLPREDNISOLONE 4 MG/1
TABLET ORAL
Qty: 21 TABLET | Refills: 0 | Status: SHIPPED | OUTPATIENT
Start: 2023-06-06 | End: 2023-06-13

## 2023-06-06 RX ORDER — AZITHROMYCIN 250 MG/1
TABLET, FILM COATED ORAL
Qty: 6 TABLET | Refills: 0 | Status: SHIPPED | OUTPATIENT
Start: 2023-06-06 | End: 2023-06-11

## 2023-06-07 ENCOUNTER — TELEMEDICINE (OUTPATIENT)
Dept: PRIMARY CARE | Facility: CLINIC | Age: 83
End: 2023-06-07
Payer: MEDICARE

## 2023-06-07 NOTE — PROGRESS NOTES
Subjective   Patient ID: Caridad Rodriguez is a 83 y.o. female who presents for the following    LINK SENT BUT UNABLE TO INITIATE VIRTUAL     Assessment/Plan       HPI      Visit Vitals  Smoking Status Former     PHYSICAL EXAM       REVIEW OF SYSTEMS       Allergies   Allergen Reactions    Adhesive Tape-Silicones Unknown    Chlorine Other    Keflex [Cephalexin] Other    Latex Unknown       Current Outpatient Medications   Medication Sig Dispense Refill    acetaminophen (TYLENOL ORAL) Take by mouth.      albuterol 90 mcg/actuation inhaler Inhale 2 puffs every 4 hours if needed.      amLODIPine (Norvasc) 10 mg tablet Take 1 tablet (10 mg) by mouth once daily.      aspirin 81 mg capsule Take 1 tablet by mouth 1 (one) time each day.      azithromycin (Zithromax) 250 mg tablet Take 2 tablets (500 mg) by mouth once daily for 1 day, THEN 1 tablet (250 mg) once daily for 4 days. Take 2 tabs (500 mg) by mouth today, than 1 daily for 4 days.. 6 tablet 0    fluticasone furoate-vilanteroL (Breo Elipta) 100-25 mcg/dose inhaler Inhale 1 puff once daily. After inhalation rinse mouth with water and spit. Use same time each day, no more than once in 24 hours.      fluticasone-umeclidin-vilanter (Trelegy Ellipta) 100-62.5-25 mcg blister with device Inhale 1 puff once daily. 1 each 11    furosemide (Lasix) 20 mg tablet Take 1 tablet (20 mg) by mouth if needed.      hydroCHLOROthiazide (HYDRODiuril) 25 mg tablet Take 1 tablet (25 mg) by mouth once daily.      lactulose 20 gram/30 mL oral solution Take 15 mL (10 g) by mouth every other day.      latanoprost (Xalatan) 0.005 % ophthalmic solution Latanoprost 0.005 % Ophthalmic Solution   Refills: 0        Start : 22-Apr-2019   Active  2.5 ML Bottle      lisinopril 40 mg tablet Take 1 tablet (40 mg) by mouth once daily.      magnesium hydroxide (Milk of Magnesia) 400 mg/5 mL suspension Milk of Magnesia 400 MG/5ML Oral Suspension   Refills: 0        Start : 19-Jan-2021   Active       methylPREDNISolone (Medrol Dospak) 4 mg tablets Take as directed on package. 21 tablet 0    metoprolol tartrate (Lopressor) 25 mg tablet Take 1 tablet (25 mg) by mouth in the morning and 1 tablet (25 mg) before bedtime. 180 tablet 1    omeprazole OTC (PriLOSEC OTC) 20 mg EC tablet Take 1 tablet (20 mg) by mouth once daily. Do not crush, chew, or split. (While on steroids)      polyethylene glycol (Glycolax) 17 gram/dose powder MiraLax 17 GM/SCOOP Oral Powder   Refills: 0        Start : 30-Jul-2020   Active      predniSONE (Deltasone) 20 mg tablet Take 1 tablet (20 mg) by mouth once daily. Take 2 tablets for 5 days, then 1 tablet for 5 days for COPD exacerbation      rivaroxaban (Xarelto) 20 mg tablet Take 1 tablet (20 mg) by mouth once daily.      rosuvastatin (Crestor) 20 mg tablet Take 1 tablet (20 mg) by mouth once daily.      TIMOLOL MALEATE OPHT Administer into affected eye(s).      tiZANidine (Zanaflex) 4 mg tablet at bedtime. 1 tab po at night before sleep      triamcinolone (Kenalog) 0.1 % cream 3 times a day. APPLY SPARINGLY TO AFFECTED AREA(S) 3 TIMES A DAY       No current facility-administered medications for this visit.       Objective     Lab on 03/22/2023   Component Date Value Ref Range Status    WBC 03/22/2023 9.1  4.4 - 11.3 x10E9/L Final    nRBC 03/22/2023 0.0  0.0 - 0.0 /100 WBC Final    RBC 03/22/2023 3.93 (L)  4.00 - 5.20 x10E12/L Final    Hemoglobin 03/22/2023 11.8 (L)  12.0 - 16.0 g/dL Final    Hematocrit 03/22/2023 37.0  36.0 - 46.0 % Final    MCV 03/22/2023 94  80 - 100 fL Final    MCHC 03/22/2023 31.9 (L)  32.0 - 36.0 g/dL Final    Platelets 03/22/2023 277  150 - 450 x10E9/L Final    RDW 03/22/2023 13.3  11.5 - 14.5 % Final    Glucose 03/22/2023 95  74 - 99 mg/dL Final    Sodium 03/22/2023 141  136 - 145 mmol/L Final    Potassium 03/22/2023 4.5  3.5 - 5.3 mmol/L Final    Chloride 03/22/2023 101  98 - 107 mmol/L Final    Bicarbonate 03/22/2023 30  21 - 32 mmol/L Final    Anion Gap  03/22/2023 15  10 - 20 mmol/L Final    Urea Nitrogen 03/22/2023 29 (H)  6 - 23 mg/dL Final    Creatinine 03/22/2023 0.84  0.50 - 1.05 mg/dL Final    GFR Female 03/22/2023 69  >90 mL/min/1.73m2 Final    Calcium 03/22/2023 10.2  8.6 - 10.6 mg/dL Final    Albumin 03/22/2023 4.4  3.4 - 5.0 g/dL Final    Alkaline Phosphatase 03/22/2023 78  33 - 136 U/L Final    Total Protein 03/22/2023 6.7  6.4 - 8.2 g/dL Final    AST 03/22/2023 18  9 - 39 U/L Final    Total Bilirubin 03/22/2023 0.6  0.0 - 1.2 mg/dL Final    ALT (SGPT) 03/22/2023 12  7 - 45 U/L Final    Hemoglobin A1C 03/22/2023 5.3  % Final    Estimated Average Glucose 03/22/2023 105  MG/DL Final    Cholesterol 03/22/2023 120  0 - 199 mg/dL Final    HDL 03/22/2023 64.9  mg/dL Final    Cholesterol/HDL Ratio 03/22/2023 1.8   Final    LDL 03/22/2023 41  0 - 99 mg/dL Final    VLDL 03/22/2023 14  0 - 40 mg/dL Final    Triglycerides 03/22/2023 70  0 - 149 mg/dL Final    TSH 03/22/2023 1.79  0.44 - 3.98 mIU/L Final   Office Visit on 03/22/2023   Component Date Value Ref Range Status    FEV1 03/27/2023 gold II  liters Final       Radiology: Reviewed imaging in powerchart.  No results found.    Family History   Problem Relation Name Age of Onset    Hypertension Mother      Stroke Mother      Hypertension Father      Stroke Father      Lung cancer Brother       Social History     Socioeconomic History    Marital status:      Spouse name: Not on file    Number of children: Not on file    Years of education: Not on file    Highest education level: Not on file   Occupational History    Not on file   Tobacco Use    Smoking status: Former     Types: Cigarettes    Smokeless tobacco: Former   Vaping Use    Vaping status: Not on file   Substance and Sexual Activity    Alcohol use: Not Currently    Drug use: Not on file    Sexual activity: Not on file   Other Topics Concern    Not on file   Social History Narrative    Not on file     Social Determinants of Health     Financial  Resource Strain: Not on file   Food Insecurity: Not on file   Transportation Needs: Not on file   Physical Activity: Not on file   Stress: Not on file   Social Connections: Not on file   Intimate Partner Violence: Not on file   Housing Stability: Not on file     Past Medical History:   Diagnosis Date    Diverticulitis of intestine, part unspecified, without perforation or abscess without bleeding 05/30/2019    Dvtrcli of intest, part unsp, w/o perf or abscess w/o bleed    Other conditions influencing health status 05/30/2019    Cystocele    Personal history of other diseases of the circulatory system     History of hypertension    Personal history of other diseases of the musculoskeletal system and connective tissue     History of osteoarthritis    Personal history of other diseases of the nervous system and sense organs     History of carpal tunnel syndrome    Personal history of urinary (tract) infections 10/10/2017    History of urinary tract infection     Past Surgical History:   Procedure Laterality Date    APPENDECTOMY  05/30/2019    Appendectomy    CHOLECYSTECTOMY  05/30/2019    Cholecystectomy    COLON SURGERY  05/30/2019    Colon Surgery    HERNIA REPAIR  05/30/2019    Hernia Repair    LUNG SURGERY  05/30/2019    Lung Surgery    MR HEAD ANGIO WO IV CONTRAST  4/23/2016    MR HEAD ANGIO WO IV CONTRAST 4/23/2016 [facility] [place] [facility]    OTHER SURGICAL HISTORY  05/30/2019    Excision of basal cell carcinoma    OTHER SURGICAL HISTORY  05/30/2019    Hemorrhoidectomy    TONSILLECTOMY  05/30/2019    Tonsillectomy    TOTAL HIP ARTHROPLASTY  05/30/2019    Hip Replacement    TOTAL KNEE ARTHROPLASTY  05/30/2019    Knee Replacement       Charting was completed using voice recognition technology and may include unintended errors.

## 2023-06-07 NOTE — TELEPHONE ENCOUNTER
Patient left a voicemail to clarify if she should start the antibiotic and prednisone or is it to keep on hand for the next time an episode occurs? She states she just finished the antibiotic yesterday. Please advise.

## 2023-06-13 DIAGNOSIS — E78.5 HYPERLIPIDEMIA, UNSPECIFIED HYPERLIPIDEMIA TYPE: ICD-10-CM

## 2023-06-13 DIAGNOSIS — I10 PRIMARY HYPERTENSION: Primary | ICD-10-CM

## 2023-06-13 RX ORDER — ROSUVASTATIN CALCIUM 20 MG/1
20 TABLET, COATED ORAL DAILY
Qty: 90 TABLET | Refills: 3 | Status: SHIPPED | OUTPATIENT
Start: 2023-06-13

## 2023-06-13 RX ORDER — AMLODIPINE BESYLATE 10 MG/1
10 TABLET ORAL DAILY
Qty: 90 TABLET | Refills: 3 | Status: SHIPPED | OUTPATIENT
Start: 2023-06-13 | End: 2023-09-22 | Stop reason: ALTCHOICE

## 2023-09-22 ENCOUNTER — OFFICE VISIT (OUTPATIENT)
Dept: PRIMARY CARE | Facility: CLINIC | Age: 83
End: 2023-09-22
Payer: MEDICARE

## 2023-09-22 VITALS
OXYGEN SATURATION: 92 % | SYSTOLIC BLOOD PRESSURE: 102 MMHG | DIASTOLIC BLOOD PRESSURE: 72 MMHG | TEMPERATURE: 97.5 F | HEART RATE: 80 BPM

## 2023-09-22 DIAGNOSIS — Z00.00 HEALTH CARE MAINTENANCE: ICD-10-CM

## 2023-09-22 DIAGNOSIS — R60.1 GENERALIZED EDEMA: ICD-10-CM

## 2023-09-22 DIAGNOSIS — D64.9 ANEMIA, UNSPECIFIED TYPE: ICD-10-CM

## 2023-09-22 DIAGNOSIS — J44.9 CHRONIC OBSTRUCTIVE PULMONARY DISEASE, UNSPECIFIED COPD TYPE (MULTI): ICD-10-CM

## 2023-09-22 DIAGNOSIS — I10 PRIMARY HYPERTENSION: ICD-10-CM

## 2023-09-22 DIAGNOSIS — I50.30 DIASTOLIC CONGESTIVE HEART FAILURE, UNSPECIFIED HF CHRONICITY (MULTI): Primary | ICD-10-CM

## 2023-09-22 PROCEDURE — 1159F MED LIST DOCD IN RCRD: CPT | Performed by: INTERNAL MEDICINE

## 2023-09-22 PROCEDURE — 3074F SYST BP LT 130 MM HG: CPT | Performed by: INTERNAL MEDICINE

## 2023-09-22 PROCEDURE — 1160F RVW MEDS BY RX/DR IN RCRD: CPT | Performed by: INTERNAL MEDICINE

## 2023-09-22 PROCEDURE — G0008 ADMIN INFLUENZA VIRUS VAC: HCPCS | Performed by: INTERNAL MEDICINE

## 2023-09-22 PROCEDURE — 90662 IIV NO PRSV INCREASED AG IM: CPT | Performed by: INTERNAL MEDICINE

## 2023-09-22 PROCEDURE — 1036F TOBACCO NON-USER: CPT | Performed by: INTERNAL MEDICINE

## 2023-09-22 PROCEDURE — 99214 OFFICE O/P EST MOD 30 MIN: CPT | Performed by: INTERNAL MEDICINE

## 2023-09-22 PROCEDURE — 3078F DIAST BP <80 MM HG: CPT | Performed by: INTERNAL MEDICINE

## 2023-09-22 PROCEDURE — 1126F AMNT PAIN NOTED NONE PRSNT: CPT | Performed by: INTERNAL MEDICINE

## 2023-09-22 RX ORDER — HYDROCHLOROTHIAZIDE 25 MG/1
25 TABLET ORAL DAILY
Qty: 90 TABLET | Refills: 3 | Status: SHIPPED | OUTPATIENT
Start: 2023-09-22

## 2023-09-22 RX ORDER — FUROSEMIDE 20 MG/1
20 TABLET ORAL AS NEEDED
Qty: 90 TABLET | Refills: 3 | Status: SHIPPED | OUTPATIENT
Start: 2023-09-22 | End: 2023-09-25 | Stop reason: SDUPTHER

## 2023-09-22 NOTE — PROGRESS NOTES
Patient ID: Caridad Rodriguez is a 83 y.o. female who presents for the following    Chronic disease follow up     Medicare wellness 2022        Assessment/Plan   COPD: spirometry  FEV1 49%, recommend once a day breo    she has a nebulizer at home. Continue Trelegy.       diastolic chf :  on 20mg lasix daily  Currently trace to + 1 edema   Add 25mg hydrochlorothiazide daily in place of amlodipine 10mg daily (2023)     dry weight is 210lbs, currently patient is 224lbs - recommend lasix 20mg TID a until dry weight is achieved     morbid obesity: stable, recommend diet and weight loss     a-fib: continue on xeralto. stable, rate controlled on metoprolol      anemia noted hgb 9-10 check iron studies b12 folate and tsh (she denies any area of bleeding)        HPI   female with hemorrhoids, htn, hld, bilateral tka, hx sbo 2021 & 2022, a-fib (on xarelto), diastolic CHF wants to follow up for         bronchitis vs COPD: patient  doing well. Has as needed inhaler      diastolic chf/ bilateral lower extremity edema: she has currently taken 20mg lasix daily. patient is on lisinopril 40mg daily, amlodipine 10mg daily.  Her current weight is 224lbs.       A-fib: patient is now on metoprolol 25mg bid and xarelto 20mg daily. echo 2021 shows normal ef with elevated RVSP. she has mild bruising on her right forearm but no bleeding from xeralto      NO BLEEDING Colonoscopy: Took cologuard at home which was positive, Coloscopy done by Dr. Peña, states she doesn't need another colonoscopy now for the rest of her life     Mammogram: 6-7 year ago, no family hx of breast cancer     patient has great granddaughters      family medical history: both mom and dad  DM and stroke, brother  lung ca      surgery: open incisional hernia repair May 17, 2022 Dr Orona      HOSPITALIZATIONS   [facility] 2022 - 2022 for small bowel obstruction that resolved within the day. months later she had her  hernia repaired in May 2022.       [facility] april 19 to april 20, 2022 for severe diarrhea. abd/pelvic ct shows SBO. labs show uti. patient improved quicker then expected. she is sent with cipro going home and she is to follow up with general surgery for hernia repair     Visit Vitals  /72 Comment: manual   Pulse 80   Temp 36.4 °C (97.5 °F)   SpO2 92%   Smoking Status Former        PHYSICAL EXAM    General appearance: Alert and in no acute distress. speech is clear and coherent  HEENT: Sclera and conjunctiva white, EOMI, No head trauma    Respiratory : No respiratory distress, normal respiratory rhythm and effort. Clear bilateral breath sounds. bilatearl wheezing  Cardiovascular: heart rate regular, S1, S2. no murmurs.    Skin inspection: Normal skin color and pigmentation, normal skin turgor and no visible rash, induration, or cellulitis  MSK: 5/5 strength upper and lower extremities without gait abnormalities. no loss of muscle mass   Neuro: 2-12 CN grossly intact. no slurred speech. no lateralizing deficit  Psychiatric Orientation: Oriented to person, place, and time. no depression, homicidal or suicidal thoughts, normal affect      REVIEW OF SYSTEMS    Constitutional: not feeling tired and no fever, chills or sweats. Denies weight loss   Cardiovascular: no exertional chest pain, no palpitations,    Lungs: Denies shortness of breath, exertional dyspnea, wheezing  Gastrointestinal: no change in bowel habits, no diarrhea, no nausea, no vomiting and no abdominal pain.    Musculoskeletal: no myalgias, no muscle weakness    Neurological: no headaches, no seizures, no numbness, no lateralizing deficits and no fainting.               Allergies   Allergen Reactions    Adhesive Tape-Silicones Unknown    Latex Unknown    Metronidazole Unknown         Current Medications          Current Outpatient Medications   Medication Sig Dispense Refill    albuterol (Ventolin HFA) 90 mcg/actuation inhaler Inhale 2 puffs every 4  (four) hours if needed.        amLODIPine (Norvasc) 10 mg tablet Take 1 tablet (10 mg) by mouth 1 (one) time each day.        aspirin 81 mg capsule Take 1 tablet by mouth 1 (one) time each day.        fluticasone furoate-vilanteroL (Breo Ellipta) 100-25 mcg/dose inhaler Inhale 1 puff in the morning. After inhalation rinse mouth with water and spit. Use same time each day, no more than once in 24 hours.        furosemide (Lasix) 20 mg tablet Take 1 tablet (20 mg) by mouth 1 (one) time each day.        hydroCHLOROthiazide (HYDRODiuril) 25 mg tablet Take 1 tablet (25 mg) by mouth 1 (one) time each day.        lactulose 20 gram/30 mL oral solution Take 15 mL (10 g) by mouth every other day.        latanoprost (Xalatan) 0.005 % ophthalmic solution Latanoprost 0.005 % Ophthalmic Solution   Refills: 0         Start : 22-Apr-2019   Active  2.5 ML Bottle        lisinopril 40 mg tablet Take 1 tablet (40 mg) by mouth 1 (one) time each day.        magnesium hydroxide (Milk of Magnesia) 400 mg/5 mL suspension Milk of Magnesia 400 MG/5ML Oral Suspension   Refills: 0         Start : 19-Jan-2021   Active        metoprolol tartrate (Lopressor) 25 mg tablet Take 1 tablet (25 mg) by mouth in the morning and at bedtime.        omeprazole OTC (PriLOSEC OTC) 20 mg EC tablet Take 1 tablet (20 mg) by mouth 1 (one) time each day. Do not crush, chew, or split. (While on steroids)        polyethylene glycol (Miralax) 17 gram/dose powder MiraLax 17 GM/SCOOP Oral Powder   Refills: 0         Start : 30-Jul-2020   Active        predniSONE (Deltasone) 20 mg tablet Take 1 tablet (20 mg) by mouth 1 (one) time each day. Take 2 tablets for 5 days, then 1 tablet for 5 days for COPD exacerbation        rivaroxaban (Xarelto) 20 mg tablet Take 1 tablet (20 mg) by mouth 1 (one) time each day.        rosuvastatin (Crestor) 20 mg tablet Take 1 tablet (20 mg) by mouth 1 (one) time each day.        tiZANidine (Zanaflex) 4 mg tablet at bedtime. 1 tab po at  night before sleep        triamcinolone (Kenalog) 0.1 % cream 3 times a day. APPLY SPARINGLY TO AFFECTED AREA(S) 3 TIMES A DAY          No current facility-administered medications for this visit.                  Objective           No visits with results within 4 Month(s) from this visit.   Latest known visit with results is:   Legacy Encounter on 10/18/2022   Component Date Value Ref Range Status    WBC, Urine 10/18/2022 928 (A)  0 - 5 /HPF Final    WBC Clumps, Urine 10/18/2022 OCC  /HPF Final    RBC, Urine 10/18/2022 73 (A)  0 - 5 /HPF Final    Squamous Epithelial Cells, Urine 10/18/2022 3  /HPF Final    Bacteria, Urine 10/18/2022 2+ (A)  /HPF Final    Mucus, Urine 10/18/2022 1+  /LPF Final    Color, Urine 10/18/2022 YELLOW  STRAW,YELLOW Final    Appearance, Urine 10/18/2022 HAZY  CLEAR Final    Specific Gravity, Urine 10/18/2022 1.011  1.005 - 1.035 Final    pH, Urine 10/18/2022 5.0  5.0 - 8.0 Final    Protein, Urine 10/18/2022 30 (1+) (A)  NEGATIVE mg/dL Final    Glucose, Urine 10/18/2022 NEGATIVE  NEGATIVE mg/dL Final    Blood, Urine 10/18/2022 LARGE (3+) (A)  NEGATIVE Final    Ketones, Urine 10/18/2022 NEGATIVE  NEGATIVE mg/dL Final    Bilirubin, Urine 10/18/2022 NEGATIVE  NEGATIVE Final    Urobilinogen, Urine 10/18/2022 <2.0  0.0 - 1.9 mg/dL Final    Nitrite, Urine 10/18/2022 NEGATIVE  NEGATIVE Final    Leukocyte Esterase, Urine 10/18/2022 LARGE (3+) (A)  NEGATIVE Final         Radiology: Reviewed imaging in powerchart.  No results found.     Family History          Family History   Problem Relation Name Age of Onset    Hypertension Mother        Stroke Mother        Hypertension Father        Stroke Father        Lung cancer Brother             Social History   Social History            Socioeconomic History    Marital status:        Spouse name: None    Number of children: None    Years of education: None    Highest education level: None   Occupational History    None   Tobacco Use    Smoking  status: Former       Types: Cigarettes    Smokeless tobacco: Former   Vaping Use    Vaping status: None   Substance and Sexual Activity    Alcohol use: None    Drug use: None    Sexual activity: None   Other Topics Concern    None   Social History Narrative    None      Social Determinants of Health      Financial Resource Strain: Not on file   Food Insecurity: Not on file   Transportation Needs: Not on file   Physical Activity: Not on file   Stress: Not on file   Social Connections: Not on file   Intimate Partner Violence: Not on file   Housing Stability: Not on file         Medical History        Past Medical History:   Diagnosis Date    Diverticulitis of intestine, part unspecified, without perforation or abscess without bleeding 05/30/2019     Dvtrcli of intest, part unsp, w/o perf or abscess w/o bleed    Other conditions influencing health status 05/30/2019     Cystocele    Personal history of other diseases of the circulatory system       History of hypertension    Personal history of other diseases of the musculoskeletal system and connective tissue       History of osteoarthritis    Personal history of other diseases of the nervous system and sense organs       History of carpal tunnel syndrome    Personal history of urinary (tract) infections 10/10/2017     History of urinary tract infection         Surgical History         Past Surgical History:   Procedure Laterality Date    APPENDECTOMY   05/30/2019     Appendectomy    CHOLECYSTECTOMY   05/30/2019     Cholecystectomy    COLON SURGERY   05/30/2019     Colon Surgery    HERNIA REPAIR   05/30/2019     Hernia Repair    LUNG SURGERY   05/30/2019     Lung Surgery    MR HEAD ANGIO WO IV CONTRAST   4/23/2016     MR HEAD ANGIO WO IV CONTRAST [place]23/[place] [facility] [place]    OTHER SURGICAL HISTORY   05/30/2019     Excision of basal cell carcinoma    OTHER SURGICAL HISTORY   05/30/2019     Hemorrhoidectomy    TONSILLECTOMY   05/30/2019     Tonsillectomy    TOTAL  HIP ARTHROPLASTY   05/30/2019     Hip Replacement    TOTAL KNEE ARTHROPLASTY   05/30/2019     Knee Replacement

## 2023-09-25 ENCOUNTER — TELEPHONE (OUTPATIENT)
Dept: PRIMARY CARE | Facility: CLINIC | Age: 83
End: 2023-09-25
Payer: MEDICARE

## 2023-09-25 DIAGNOSIS — R60.1 GENERALIZED EDEMA: ICD-10-CM

## 2023-09-25 RX ORDER — FUROSEMIDE 20 MG/1
20 TABLET ORAL DAILY
Qty: 90 TABLET | Refills: 3 | Status: SHIPPED | OUTPATIENT
Start: 2023-09-25 | End: 2023-12-29 | Stop reason: SDUPTHER

## 2023-09-25 NOTE — TELEPHONE ENCOUNTER
Patient left message stating that pharmacy needs clarification on directions for furosemide. Rx states as needed but pt says she takes one daily. Please advise.

## 2023-10-09 ENCOUNTER — TELEPHONE (OUTPATIENT)
Dept: PRIMARY CARE | Facility: CLINIC | Age: 83
End: 2023-10-09
Payer: MEDICARE

## 2023-10-09 DIAGNOSIS — J44.9 CHRONIC OBSTRUCTIVE PULMONARY DISEASE, UNSPECIFIED COPD TYPE (MULTI): Primary | ICD-10-CM

## 2023-10-09 RX ORDER — LEVOFLOXACIN 500 MG/1
500 TABLET, FILM COATED ORAL DAILY
Qty: 10 TABLET | Refills: 0 | Status: SHIPPED | OUTPATIENT
Start: 2023-10-09 | End: 2023-10-19

## 2023-10-09 NOTE — TELEPHONE ENCOUNTER
PT IS CURRENTLY TAKING PREDNISONE. WAS INSTRUCTED TO TAKE IF BRONCHITIS WAS COMING ON TO TAKE. BLOOD IN PHLEGM, JUST A LITTLE. COUGHING A LOT ON PHONE

## 2023-10-23 DIAGNOSIS — I10 HYPERTENSION, UNSPECIFIED TYPE: ICD-10-CM

## 2023-10-23 RX ORDER — METOPROLOL TARTRATE 25 MG/1
25 TABLET, FILM COATED ORAL 2 TIMES DAILY
Qty: 180 TABLET | Refills: 3 | Status: SHIPPED | OUTPATIENT
Start: 2023-10-23 | End: 2024-10-22

## 2023-10-26 ENCOUNTER — TELEPHONE (OUTPATIENT)
Dept: PRIMARY CARE | Facility: CLINIC | Age: 83
End: 2023-10-26
Payer: MEDICARE

## 2023-10-26 NOTE — TELEPHONE ENCOUNTER
Pt is experiencing s/sx of vaginal itch and discomfort s/p Abx and would like diflucan ordered or something recommended eith Rx and or OTC

## 2023-10-27 DIAGNOSIS — B37.31 VAGINAL CANDIDA: Primary | ICD-10-CM

## 2023-10-27 RX ORDER — FLUCONAZOLE 150 MG/1
150 TABLET ORAL ONCE
Qty: 1 TABLET | Refills: 0 | Status: SHIPPED | OUTPATIENT
Start: 2023-10-27 | End: 2023-10-27

## 2023-10-31 ENCOUNTER — TELEPHONE (OUTPATIENT)
Dept: PRIMARY CARE | Facility: CLINIC | Age: 83
End: 2023-10-31
Payer: MEDICARE

## 2023-12-18 ENCOUNTER — TELEMEDICINE (OUTPATIENT)
Dept: PRIMARY CARE | Facility: CLINIC | Age: 83
End: 2023-12-18
Payer: MEDICARE

## 2023-12-18 DIAGNOSIS — J20.9 ACUTE BRONCHITIS, UNSPECIFIED ORGANISM: Primary | ICD-10-CM

## 2023-12-18 PROCEDURE — 99213 OFFICE O/P EST LOW 20 MIN: CPT | Performed by: INTERNAL MEDICINE

## 2023-12-18 RX ORDER — LEVOFLOXACIN 500 MG/1
500 TABLET, FILM COATED ORAL DAILY
Qty: 10 TABLET | Refills: 0 | Status: SHIPPED | OUTPATIENT
Start: 2023-12-18 | End: 2023-12-29 | Stop reason: ALTCHOICE

## 2023-12-18 RX ORDER — GUAIFENESIN 600 MG/1
1200 TABLET, EXTENDED RELEASE ORAL 2 TIMES DAILY
Qty: 60 TABLET | Refills: 3 | Status: SHIPPED | OUTPATIENT
Start: 2023-12-18 | End: 2024-12-17

## 2023-12-18 RX ORDER — PREDNISONE 20 MG/1
TABLET ORAL
Qty: 15 TABLET | Refills: 0 | Status: SHIPPED | OUTPATIENT
Start: 2023-12-18 | End: 2023-12-29 | Stop reason: SDUPTHER

## 2023-12-18 NOTE — PROGRESS NOTES
Patient ID: Caridad Rodriguez is a 83 y.o. female who presents for the following    Chronic disease follow up     Medicare wellness November 1, 2022   Assessment/Plan   Acute COPD exacerbation  Prednisone 40mg x 5 day 20mg for 5 days  Levaquin 500mg daily for 10 days  Mucinex 1200mg daily     There was a one time occurrence of coughing up blood. Recommend chest xray when she is feeling better.     Other medical issues see below  COPD: spirometry  FEV1 49%, recommend once a day breo    she has a nebulizer at home. Continue Trelegy.       diastolic chf :  on 20mg lasix daily  Currently trace to + 1 edema   Add 25mg hydrochlorothiazide daily in place of amlodipine 10mg daily (9/22/2023)     dry weight is 210lbs, currently patient is 224lbs - recommend lasix 20mg TID a until dry weight is achieved     morbid obesity: stable, recommend diet and weight loss     a-fib: continue on xeralto. stable, rate controlled on metoprolol      anemia noted hgb 9-10 check iron studies b12 folate and tsh (she denies any area of bleeding)        HPI   female with hemorrhoids, htn, hld, bilateral tka, hx sbo jan 2021 & Feb 2022, a-fib (on xarelto), diastolic CHF wants to follow up for       acute bronchitis: patient started on Friday. Starting to have mucous and green. She did have some blood. She has been using her nebulizer.       Acute COPD exacerbation: patient coughing up green thick mucous for three days now. She says that she had a single episode of coughing up blood but none since. She denies any SOB but that she does have lots of coughing episodes.     Other medical issues see below    bronchitis vs COPD: patient  doing well. Has as needed inhaler      diastolic chf/ bilateral lower extremity edema: she has currently taken 20mg lasix daily. patient is on lisinopril 40mg daily, amlodipine 10mg daily.  Her current weight is 224lbs.       A-fib: patient is now on metoprolol 25mg bid and xarelto 20mg daily. echo september 2021 shows  normal ef with elevated RVSP. she has mild bruising on her right forearm but no bleeding from xeralto      NO BLEEDING Colonoscopy: Took cologuard at home which was positive, Coloscopy done by Dr. Peña, states she doesn't need another colonoscopy now for the rest of her life     Mammogram: 6-7 year ago, no family hx of breast cancer     patient has great granddaughters      family medical history: both mom and dad  DM and stroke, brother  lung ca      surgery: open incisional hernia repair May 17, 2022 Dr Orona      HOSPITALIZATIONS   [facility] 2022 - 2022 for small bowel obstruction that resolved within the day. months later she had her hernia repaired in May 2022.       [facility]  to 2022 for severe diarrhea. abd/pelvic ct shows SBO. labs show uti. patient improved quicker then expected. she is sent with cipro going home and she is to follow up with general surgery for hernia repair     Visit Vitals  Smoking Status Former        PHYSICAL EXAM       REVIEW OF SYSTEMS               Allergies   Allergen Reactions    Adhesive Tape-Silicones Unknown    Latex Unknown    Metronidazole Unknown         Current Medications          Current Outpatient Medications   Medication Sig Dispense Refill    albuterol (Ventolin HFA) 90 mcg/actuation inhaler Inhale 2 puffs every 4 (four) hours if needed.        amLODIPine (Norvasc) 10 mg tablet Take 1 tablet (10 mg) by mouth 1 (one) time each day.        aspirin 81 mg capsule Take 1 tablet by mouth 1 (one) time each day.        fluticasone furoate-vilanteroL (Breo Ellipta) 100-25 mcg/dose inhaler Inhale 1 puff in the morning. After inhalation rinse mouth with water and spit. Use same time each day, no more than once in 24 hours.        furosemide (Lasix) 20 mg tablet Take 1 tablet (20 mg) by mouth 1 (one) time each day.        hydroCHLOROthiazide (HYDRODiuril) 25 mg tablet Take 1 tablet (25 mg) by mouth 1 (one) time each day.        lactulose 20  gram/30 mL oral solution Take 15 mL (10 g) by mouth every other day.        latanoprost (Xalatan) 0.005 % ophthalmic solution Latanoprost 0.005 % Ophthalmic Solution   Refills: 0         Start : 22-Apr-2019   Active  2.5 ML Bottle        lisinopril 40 mg tablet Take 1 tablet (40 mg) by mouth 1 (one) time each day.        magnesium hydroxide (Milk of Magnesia) 400 mg/5 mL suspension Milk of Magnesia 400 MG/5ML Oral Suspension   Refills: 0         Start : 19-Jan-2021   Active        metoprolol tartrate (Lopressor) 25 mg tablet Take 1 tablet (25 mg) by mouth in the morning and at bedtime.        omeprazole OTC (PriLOSEC OTC) 20 mg EC tablet Take 1 tablet (20 mg) by mouth 1 (one) time each day. Do not crush, chew, or split. (While on steroids)        polyethylene glycol (Miralax) 17 gram/dose powder MiraLax 17 GM/SCOOP Oral Powder   Refills: 0         Start : 30-Jul-2020   Active        predniSONE (Deltasone) 20 mg tablet Take 1 tablet (20 mg) by mouth 1 (one) time each day. Take 2 tablets for 5 days, then 1 tablet for 5 days for COPD exacerbation        rivaroxaban (Xarelto) 20 mg tablet Take 1 tablet (20 mg) by mouth 1 (one) time each day.        rosuvastatin (Crestor) 20 mg tablet Take 1 tablet (20 mg) by mouth 1 (one) time each day.        tiZANidine (Zanaflex) 4 mg tablet at bedtime. 1 tab po at night before sleep        triamcinolone (Kenalog) 0.1 % cream 3 times a day. APPLY SPARINGLY TO AFFECTED AREA(S) 3 TIMES A DAY          No current facility-administered medications for this visit.                  Objective           No visits with results within 4 Month(s) from this visit.   Latest known visit with results is:   Legacy Encounter on 10/18/2022   Component Date Value Ref Range Status    WBC, Urine 10/18/2022 928 (A)  0 - 5 /HPF Final    WBC Clumps, Urine 10/18/2022 OCC  /HPF Final    RBC, Urine 10/18/2022 73 (A)  0 - 5 /HPF Final    Squamous Epithelial Cells, Urine 10/18/2022 3  /HPF Final    Bacteria, Urine  10/18/2022 2+ (A)  /HPF Final    Mucus, Urine 10/18/2022 1+  /LPF Final    Color, Urine 10/18/2022 YELLOW  STRAW,YELLOW Final    Appearance, Urine 10/18/2022 HAZY  CLEAR Final    Specific Gravity, Urine 10/18/2022 1.011  1.005 - 1.035 Final    pH, Urine 10/18/2022 5.0  5.0 - 8.0 Final    Protein, Urine 10/18/2022 30 (1+) (A)  NEGATIVE mg/dL Final    Glucose, Urine 10/18/2022 NEGATIVE  NEGATIVE mg/dL Final    Blood, Urine 10/18/2022 LARGE (3+) (A)  NEGATIVE Final    Ketones, Urine 10/18/2022 NEGATIVE  NEGATIVE mg/dL Final    Bilirubin, Urine 10/18/2022 NEGATIVE  NEGATIVE Final    Urobilinogen, Urine 10/18/2022 <2.0  0.0 - 1.9 mg/dL Final    Nitrite, Urine 10/18/2022 NEGATIVE  NEGATIVE Final    Leukocyte Esterase, Urine 10/18/2022 LARGE (3+) (A)  NEGATIVE Final         Radiology: Reviewed imaging in powerchart.  No results found.     Family History          Family History   Problem Relation Name Age of Onset    Hypertension Mother        Stroke Mother        Hypertension Father        Stroke Father        Lung cancer Brother             Social History   Social History            Socioeconomic History    Marital status:        Spouse name: None    Number of children: None    Years of education: None    Highest education level: None   Occupational History    None   Tobacco Use    Smoking status: Former       Types: Cigarettes    Smokeless tobacco: Former   Vaping Use    Vaping status: None   Substance and Sexual Activity    Alcohol use: None    Drug use: None    Sexual activity: None   Other Topics Concern    None   Social History Narrative    None      Social Determinants of Health      Financial Resource Strain: Not on file   Food Insecurity: Not on file   Transportation Needs: Not on file   Physical Activity: Not on file   Stress: Not on file   Social Connections: Not on file   Intimate Partner Violence: Not on file   Housing Stability: Not on file         Medical History        Past Medical History:    Diagnosis Date    Diverticulitis of intestine, part unspecified, without perforation or abscess without bleeding 05/30/2019     Dvtrcli of intest, part unsp, w/o perf or abscess w/o bleed    Other conditions influencing health status 05/30/2019     Cystocele    Personal history of other diseases of the circulatory system       History of hypertension    Personal history of other diseases of the musculoskeletal system and connective tissue       History of osteoarthritis    Personal history of other diseases of the nervous system and sense organs       History of carpal tunnel syndrome    Personal history of urinary (tract) infections 10/10/2017     History of urinary tract infection         Surgical History         Past Surgical History:   Procedure Laterality Date    APPENDECTOMY   05/30/2019     Appendectomy    CHOLECYSTECTOMY   05/30/2019     Cholecystectomy    COLON SURGERY   05/30/2019     Colon Surgery    HERNIA REPAIR   05/30/2019     Hernia Repair    LUNG SURGERY   05/30/2019     Lung Surgery    MR HEAD ANGIO WO IV CONTRAST   4/23/2016     MR HEAD ANGIO WO IV CONTRAST [place] [facility] [place] [facility]    OTHER SURGICAL HISTORY   05/30/2019     Excision of basal cell carcinoma    OTHER SURGICAL HISTORY   05/30/2019     Hemorrhoidectomy    TONSILLECTOMY   05/30/2019     Tonsillectomy    TOTAL HIP ARTHROPLASTY   05/30/2019     Hip Replacement    TOTAL KNEE ARTHROPLASTY   05/30/2019     Knee Replacement

## 2023-12-22 ENCOUNTER — APPOINTMENT (OUTPATIENT)
Dept: PRIMARY CARE | Facility: CLINIC | Age: 83
End: 2023-12-22
Payer: MEDICARE

## 2023-12-29 ENCOUNTER — OFFICE VISIT (OUTPATIENT)
Dept: PRIMARY CARE | Facility: CLINIC | Age: 83
End: 2023-12-29
Payer: MEDICARE

## 2023-12-29 VITALS
HEART RATE: 75 BPM | SYSTOLIC BLOOD PRESSURE: 98 MMHG | OXYGEN SATURATION: 99 % | WEIGHT: 215 LBS | BODY MASS INDEX: 36.9 KG/M2 | DIASTOLIC BLOOD PRESSURE: 60 MMHG

## 2023-12-29 DIAGNOSIS — I10 PRIMARY HYPERTENSION: ICD-10-CM

## 2023-12-29 DIAGNOSIS — J44.9 CHRONIC OBSTRUCTIVE PULMONARY DISEASE, UNSPECIFIED COPD TYPE (MULTI): Primary | ICD-10-CM

## 2023-12-29 DIAGNOSIS — I48.91 ATRIAL FIBRILLATION, UNSPECIFIED TYPE (MULTI): ICD-10-CM

## 2023-12-29 DIAGNOSIS — R60.1 GENERALIZED EDEMA: ICD-10-CM

## 2023-12-29 DIAGNOSIS — Z00.00 WELLNESS EXAMINATION: ICD-10-CM

## 2023-12-29 DIAGNOSIS — J20.9 ACUTE BRONCHITIS, UNSPECIFIED ORGANISM: ICD-10-CM

## 2023-12-29 DIAGNOSIS — L30.9 ECZEMA, UNSPECIFIED TYPE: ICD-10-CM

## 2023-12-29 LAB
NON-UH HIE A/G RATIO: 1.3
NON-UH HIE ALB: 3.7 G/DL (ref 3.4–5)
NON-UH HIE ALK PHOS: 91 UNIT/L (ref 45–117)
NON-UH HIE BILIRUBIN, TOTAL: 0.4 MG/DL (ref 0.3–1.2)
NON-UH HIE BUN/CREAT RATIO: 34.6
NON-UH HIE BUN: 45 MG/DL (ref 9–23)
NON-UH HIE CALCIUM: 10 MG/DL (ref 8.7–10.4)
NON-UH HIE CALCULATED OSMOLALITY: 289 MOSM/KG (ref 275–295)
NON-UH HIE CHLORIDE: 102 MMOL/L (ref 98–107)
NON-UH HIE CO2, VENOUS: 30 MMOL/L (ref 20–31)
NON-UH HIE CREATININE: 1.3 MG/DL (ref 0.5–0.8)
NON-UH HIE GFR AA: 47
NON-UH HIE GLOBULIN: 2.8 G/DL
NON-UH HIE GLOMERULAR FILTRATION RATE: 39 ML/MIN/1.73M?
NON-UH HIE GLUCOSE: 87 MG/DL (ref 74–106)
NON-UH HIE GOT: 17 UNIT/L (ref 15–37)
NON-UH HIE GPT: 15 UNIT/L (ref 10–49)
NON-UH HIE HCT: 38.6 % (ref 36–46)
NON-UH HIE HGB: 13.1 G/DL (ref 12–16)
NON-UH HIE INSTR WBC ND: 10.5
NON-UH HIE K: 4.7 MMOL/L (ref 3.5–5.1)
NON-UH HIE MCH: 31.2 PG (ref 27–34)
NON-UH HIE MCHC: 33.9 G/DL (ref 32–37)
NON-UH HIE MCV: 92 FL (ref 80–100)
NON-UH HIE MPV: 7.7 FL (ref 7.4–10.4)
NON-UH HIE NA: 139 MMOL/L (ref 135–145)
NON-UH HIE PLATELET: 271 X10 (ref 150–450)
NON-UH HIE RBC: 4.19 X10 (ref 4.2–5.4)
NON-UH HIE RDW: 14.8 % (ref 11.5–14.5)
NON-UH HIE TOTAL PROTEIN: 6.5 G/DL (ref 5.7–8.2)
NON-UH HIE TSH: 1.22 UIU/ML (ref 0.55–4.78)
NON-UH HIE WBC: 10.5 X10 (ref 4.5–11)

## 2023-12-29 PROCEDURE — 1036F TOBACCO NON-USER: CPT | Performed by: INTERNAL MEDICINE

## 2023-12-29 PROCEDURE — 1126F AMNT PAIN NOTED NONE PRSNT: CPT | Performed by: INTERNAL MEDICINE

## 2023-12-29 PROCEDURE — 1159F MED LIST DOCD IN RCRD: CPT | Performed by: INTERNAL MEDICINE

## 2023-12-29 PROCEDURE — 1160F RVW MEDS BY RX/DR IN RCRD: CPT | Performed by: INTERNAL MEDICINE

## 2023-12-29 PROCEDURE — 99214 OFFICE O/P EST MOD 30 MIN: CPT | Performed by: INTERNAL MEDICINE

## 2023-12-29 PROCEDURE — 1170F FXNL STATUS ASSESSED: CPT | Performed by: INTERNAL MEDICINE

## 2023-12-29 PROCEDURE — G0439 PPPS, SUBSEQ VISIT: HCPCS | Performed by: INTERNAL MEDICINE

## 2023-12-29 PROCEDURE — 3074F SYST BP LT 130 MM HG: CPT | Performed by: INTERNAL MEDICINE

## 2023-12-29 PROCEDURE — 3078F DIAST BP <80 MM HG: CPT | Performed by: INTERNAL MEDICINE

## 2023-12-29 RX ORDER — AZITHROMYCIN 250 MG/1
TABLET, FILM COATED ORAL
Qty: 6 TABLET | Refills: 0 | Status: SHIPPED | OUTPATIENT
Start: 2023-12-29 | End: 2024-01-03

## 2023-12-29 RX ORDER — FUROSEMIDE 20 MG/1
20 TABLET ORAL DAILY
Qty: 90 TABLET | Refills: 3 | Status: SHIPPED | OUTPATIENT
Start: 2023-12-29

## 2023-12-29 RX ORDER — PREDNISONE 20 MG/1
TABLET ORAL
Qty: 15 TABLET | Refills: 0 | Status: SHIPPED | OUTPATIENT
Start: 2023-12-29

## 2023-12-29 RX ORDER — TRIAMCINOLONE ACETONIDE 1 MG/G
CREAM TOPICAL DAILY
Qty: 30 G | Refills: 2 | Status: SHIPPED | OUTPATIENT
Start: 2023-12-29

## 2023-12-29 ASSESSMENT — PATIENT HEALTH QUESTIONNAIRE - PHQ9
SUM OF ALL RESPONSES TO PHQ9 QUESTIONS 1 AND 2: 0
2. FEELING DOWN, DEPRESSED OR HOPELESS: NOT AT ALL
1. LITTLE INTEREST OR PLEASURE IN DOING THINGS: NOT AT ALL

## 2023-12-29 ASSESSMENT — ACTIVITIES OF DAILY LIVING (ADL)
MANAGING_FINANCES: INDEPENDENT
DRESSING: INDEPENDENT
TAKING_MEDICATION: INDEPENDENT
DOING_HOUSEWORK: INDEPENDENT
BATHING: INDEPENDENT
GROCERY_SHOPPING: INDEPENDENT

## 2023-12-29 NOTE — PROGRESS NOTES
Patient ID: Caridad Rodriguez is a 83 y.o. female who presents for the following    Chronic disease follow up     Medicare wellness 12/29/23   Assessment/Plan    COPD stable on trelgey   Responds well   Prednisone 40mg x 5 day 20mg for 5 days  Azithromycin  Medications given upfront to prevent hospitalizations, patient is educated how to administer medications for her COPD exacerbation symptoms.   Mucinex 1200mg daily     There was a one time occurrence of coughing up blood. Recommend chest xray when she is feeling better.     Other medical issues see below  COPD: spirometry  FEV1 49%, recommend once a day breo    she has a nebulizer at home. Continue Trelegy.       diastolic chf :  on 20mg lasix daily  Currently trace edema     25mg hydrochlorothiazide daily in place of amlodipine 10mg daily (9/22/2023)     dry weight is 210lbs, currently patient is 215lbs     - recommend lasix 20mg TID a until dry weight is achieved     morbid obesity: stable, recommend diet and weight loss     a-fib: continue on xeralto. stable, rate controlled on metoprolol      anemia noted hgb 9-10 check iron studies b12 folate and tsh (she denies any area of bleeding)     Needs RSV vaccine and covid booster up date   HPI   female with hemorrhoids, htn, hld, bilateral tka, hx sbo jan 2021 & Feb 2022, a-fib (on xarelto), diastolic CHF wants to follow up for      bronchitis vs COPD: patient  doing well. Has as needed inhaler      diastolic chf/ bilateral lower extremity edema: she has currently taken 20mg lasix daily. patient is on lisinopril 40mg daily, amlodipine 10mg daily.  Her current weight is 224lbs.       A-fib: patient is now on metoprolol 25mg bid and xarelto 20mg daily. echo september 2021 shows normal ef with elevated RVSP. she has mild bruising on her right forearm but no bleeding from xeralto      NO BLEEDING Colonoscopy: Took cologuard at home which was positive, Coloscopy done by Dr. Peña, states she doesn't need another  colonoscopy now for the rest of her life     Mammogram: 6-7 year ago, no family hx of breast cancer     patient has great granddaughters      family medical history: both mom and dad  DM and stroke, brother  lung ca      surgery: open incisional hernia repair May 17, 2022 Dr Orona      HOSPITALIZATIONS   [facility] 2022 - 2022 for small bowel obstruction that resolved within the day. months later she had her hernia repaired in May 2022.       [facility]  to 2022 for severe diarrhea. abd/pelvic ct shows SBO. labs show uti. patient improved quicker then expected. she is sent with cipro going home and she is to follow up with general surgery for hernia repair     Visit Vitals  BP 98/60   Pulse 75   Wt 97.5 kg (215 lb)   SpO2 99%   BMI 36.90 kg/m²   Smoking Status Former   BSA 2.1 m²        PHYSICAL EXAM     General appearance: Alert and in no acute distress. speech is clear and coherent  HEENT: Sclera and conjunctiva white, EOMI, uvela midline, no mouth lesions. PERRLA,  nasal turbinates are not swollen without exudate. TM's Allen with cone of light, external ear canal with scant cerumen. No head trauma    Respiratory : No respiratory distress, normal respiratory rhythm and effort. Clear bilateral breath sounds. No wheezing or rhonchi.   Cardiovascular: heart rate regular, S1, S2. no murmurs. no Lower extremity edema  Skin inspection: Normal skin color and pigmentation, normal skin turgor and no visible rash, induration, or cellulitis  MSK: 5/5 strength upper and lower extremities without gait abnormalities. no loss of muscle mass   Neuro: 2-12 CN grossly intact.  no slurred speech. no lateralizing deficit  Psychiatric Orientation: Oriented to person, place, and time. no depression, homicidal or suicidal thoughts, normal affect   REVIEW OF SYSTEMS        Constitutional: not feeling tired and no fever, chills or sweats. Denies weight loss     Cardiovascular: no exertional chest pain, no  palpitations, no lower extremity edema   Lungs: Denies shortness of breath, exertional dyspnea, wheezing  Gastrointestinal: no change in bowel habits, no diarrhea, no nausea,    Musculoskeletal: no myalgias, no muscle weakness    Psychiatric: no depression and no anxiety.   Urine: denies polyuria, hematuria, dysuria   Endocrine: no cold intolerance, no heat intolerance         Allergies   Allergen Reactions    Adhesive Tape-Silicones Unknown    Latex Unknown    Metronidazole Unknown         Current Medications          Current Outpatient Medications   Medication Sig Dispense Refill    albuterol (Ventolin HFA) 90 mcg/actuation inhaler Inhale 2 puffs every 4 (four) hours if needed.        amLODIPine (Norvasc) 10 mg tablet Take 1 tablet (10 mg) by mouth 1 (one) time each day.        aspirin 81 mg capsule Take 1 tablet by mouth 1 (one) time each day.        fluticasone furoate-vilanteroL (Breo Ellipta) 100-25 mcg/dose inhaler Inhale 1 puff in the morning. After inhalation rinse mouth with water and spit. Use same time each day, no more than once in 24 hours.        furosemide (Lasix) 20 mg tablet Take 1 tablet (20 mg) by mouth 1 (one) time each day.        hydroCHLOROthiazide (HYDRODiuril) 25 mg tablet Take 1 tablet (25 mg) by mouth 1 (one) time each day.        lactulose 20 gram/30 mL oral solution Take 15 mL (10 g) by mouth every other day.        latanoprost (Xalatan) 0.005 % ophthalmic solution Latanoprost 0.005 % Ophthalmic Solution   Refills: 0         Start : 22-Apr-2019   Active  2.5 ML Bottle        lisinopril 40 mg tablet Take 1 tablet (40 mg) by mouth 1 (one) time each day.        magnesium hydroxide (Milk of Magnesia) 400 mg/5 mL suspension Milk of Magnesia 400 MG/5ML Oral Suspension   Refills: 0         Start : 19-Jan-2021   Active        metoprolol tartrate (Lopressor) 25 mg tablet Take 1 tablet (25 mg) by mouth in the morning and at bedtime.        omeprazole OTC (PriLOSEC OTC) 20 mg EC tablet Take 1  tablet (20 mg) by mouth 1 (one) time each day. Do not crush, chew, or split. (While on steroids)        polyethylene glycol (Miralax) 17 gram/dose powder MiraLax 17 GM/SCOOP Oral Powder   Refills: 0         Start : 30-Jul-2020   Active        predniSONE (Deltasone) 20 mg tablet Take 1 tablet (20 mg) by mouth 1 (one) time each day. Take 2 tablets for 5 days, then 1 tablet for 5 days for COPD exacerbation        rivaroxaban (Xarelto) 20 mg tablet Take 1 tablet (20 mg) by mouth 1 (one) time each day.        rosuvastatin (Crestor) 20 mg tablet Take 1 tablet (20 mg) by mouth 1 (one) time each day.        tiZANidine (Zanaflex) 4 mg tablet at bedtime. 1 tab po at night before sleep        triamcinolone (Kenalog) 0.1 % cream 3 times a day. APPLY SPARINGLY TO AFFECTED AREA(S) 3 TIMES A DAY          No current facility-administered medications for this visit.                  Objective           No visits with results within 4 Month(s) from this visit.   Latest known visit with results is:   Legacy Encounter on 10/18/2022   Component Date Value Ref Range Status    WBC, Urine 10/18/2022 928 (A)  0 - 5 /HPF Final    WBC Clumps, Urine 10/18/2022 OCC  /HPF Final    RBC, Urine 10/18/2022 73 (A)  0 - 5 /HPF Final    Squamous Epithelial Cells, Urine 10/18/2022 3  /HPF Final    Bacteria, Urine 10/18/2022 2+ (A)  /HPF Final    Mucus, Urine 10/18/2022 1+  /LPF Final    Color, Urine 10/18/2022 YELLOW  STRAW,YELLOW Final    Appearance, Urine 10/18/2022 HAZY  CLEAR Final    Specific Gravity, Urine 10/18/2022 1.011  1.005 - 1.035 Final    pH, Urine 10/18/2022 5.0  5.0 - 8.0 Final    Protein, Urine 10/18/2022 30 (1+) (A)  NEGATIVE mg/dL Final    Glucose, Urine 10/18/2022 NEGATIVE  NEGATIVE mg/dL Final    Blood, Urine 10/18/2022 LARGE (3+) (A)  NEGATIVE Final    Ketones, Urine 10/18/2022 NEGATIVE  NEGATIVE mg/dL Final    Bilirubin, Urine 10/18/2022 NEGATIVE  NEGATIVE Final    Urobilinogen, Urine 10/18/2022 <2.0  0.0 - 1.9 mg/dL Final     Nitrite, Urine 10/18/2022 NEGATIVE  NEGATIVE Final    Leukocyte Esterase, Urine 10/18/2022 LARGE (3+) (A)  NEGATIVE Final         Radiology: Reviewed imaging in powerchart.  No results found.     Family History          Family History   Problem Relation Name Age of Onset    Hypertension Mother        Stroke Mother        Hypertension Father        Stroke Father        Lung cancer Brother             Social History   Social History            Socioeconomic History    Marital status:        Spouse name: None    Number of children: None    Years of education: None    Highest education level: None   Occupational History    None   Tobacco Use    Smoking status: Former       Types: Cigarettes    Smokeless tobacco: Former   Vaping Use    Vaping status: None   Substance and Sexual Activity    Alcohol use: None    Drug use: None    Sexual activity: None   Other Topics Concern    None   Social History Narrative    None      Social Determinants of Health      Financial Resource Strain: Not on file   Food Insecurity: Not on file   Transportation Needs: Not on file   Physical Activity: Not on file   Stress: Not on file   Social Connections: Not on file   Intimate Partner Violence: Not on file   Housing Stability: Not on file         Medical History        Past Medical History:   Diagnosis Date    Diverticulitis of intestine, part unspecified, without perforation or abscess without bleeding 05/30/2019     Dvtrcli of intest, part unsp, w/o perf or abscess w/o bleed    Other conditions influencing health status 05/30/2019     Cystocele    Personal history of other diseases of the circulatory system       History of hypertension    Personal history of other diseases of the musculoskeletal system and connective tissue       History of osteoarthritis    Personal history of other diseases of the nervous system and sense organs       History of carpal tunnel syndrome    Personal history of urinary (tract) infections 10/10/2017      History of urinary tract infection         Surgical History         Past Surgical History:   Procedure Laterality Date    APPENDECTOMY   05/30/2019     Appendectomy    CHOLECYSTECTOMY   05/30/2019     Cholecystectomy    COLON SURGERY   05/30/2019     Colon Surgery    HERNIA REPAIR   05/30/2019     Hernia Repair    LUNG SURGERY   05/30/2019     Lung Surgery    MR HEAD ANGIO WO IV CONTRAST   4/23/2016     MR HEAD ANGIO WO IV CONTRAST 4/23/2016 [facility] [place] [facility]    OTHER SURGICAL HISTORY   05/30/2019     Excision of basal cell carcinoma    OTHER SURGICAL HISTORY   05/30/2019     Hemorrhoidectomy    TONSILLECTOMY   05/30/2019     Tonsillectomy    TOTAL HIP ARTHROPLASTY   05/30/2019     Hip Replacement    TOTAL KNEE ARTHROPLASTY   05/30/2019     Knee Replacement

## 2024-01-04 ENCOUNTER — TELEPHONE (OUTPATIENT)
Dept: PRIMARY CARE | Facility: CLINIC | Age: 84
End: 2024-01-04
Payer: MEDICARE

## 2024-01-04 NOTE — TELEPHONE ENCOUNTER
----- Message from Tyrell Marlow DO sent at 1/4/2024  2:46 PM EST -----  How much lasix is patient on as I need to reduce it a little bit. thanks

## 2024-01-15 DIAGNOSIS — I10 PRIMARY HYPERTENSION: ICD-10-CM

## 2024-01-15 RX ORDER — LISINOPRIL 40 MG/1
40 TABLET ORAL DAILY
Qty: 90 TABLET | Refills: 3 | Status: SHIPPED | OUTPATIENT
Start: 2024-01-15

## 2024-02-27 ENCOUNTER — TELEPHONE (OUTPATIENT)
Dept: PRIMARY CARE | Facility: CLINIC | Age: 84
End: 2024-02-27

## 2024-02-27 NOTE — TELEPHONE ENCOUNTER
PT CALLING FOR SAMPLES OF XARELTO 20 MG    WILL BE OUT MIDDLE OF MARCH UNTIL SHE CAN GET MED FROM COMPANY. STATES LEFT MESSAGE LAST WEEK

## 2024-03-29 ENCOUNTER — APPOINTMENT (OUTPATIENT)
Dept: PRIMARY CARE | Facility: CLINIC | Age: 84
End: 2024-03-29
Payer: MEDICARE

## 2024-03-29 ENCOUNTER — HOSPITAL ENCOUNTER (OUTPATIENT)
Dept: RADIOLOGY | Facility: CLINIC | Age: 84
Discharge: HOME | End: 2024-03-29
Payer: MEDICARE

## 2024-03-29 ENCOUNTER — OFFICE VISIT (OUTPATIENT)
Dept: URGENT CARE | Facility: CLINIC | Age: 84
End: 2024-03-29
Payer: MEDICARE

## 2024-03-29 ENCOUNTER — TELEPHONE (OUTPATIENT)
Dept: PRIMARY CARE | Facility: CLINIC | Age: 84
End: 2024-03-29

## 2024-03-29 VITALS
OXYGEN SATURATION: 96 % | RESPIRATION RATE: 18 BRPM | SYSTOLIC BLOOD PRESSURE: 172 MMHG | TEMPERATURE: 97.5 F | DIASTOLIC BLOOD PRESSURE: 90 MMHG | HEIGHT: 64 IN | HEART RATE: 79 BPM | BODY MASS INDEX: 37.9 KG/M2 | WEIGHT: 222 LBS

## 2024-03-29 DIAGNOSIS — M25.512 PAIN IN JOINT OF LEFT SHOULDER: ICD-10-CM

## 2024-03-29 DIAGNOSIS — M25.512 PAIN IN JOINT OF LEFT SHOULDER: Primary | ICD-10-CM

## 2024-03-29 PROCEDURE — 3077F SYST BP >= 140 MM HG: CPT | Performed by: PHYSICIAN ASSISTANT

## 2024-03-29 PROCEDURE — 73030 X-RAY EXAM OF SHOULDER: CPT | Mod: LEFT SIDE | Performed by: RADIOLOGY

## 2024-03-29 PROCEDURE — 73030 X-RAY EXAM OF SHOULDER: CPT | Mod: LT

## 2024-03-29 PROCEDURE — 1125F AMNT PAIN NOTED PAIN PRSNT: CPT | Performed by: PHYSICIAN ASSISTANT

## 2024-03-29 PROCEDURE — 99214 OFFICE O/P EST MOD 30 MIN: CPT | Performed by: PHYSICIAN ASSISTANT

## 2024-03-29 PROCEDURE — 3080F DIAST BP >= 90 MM HG: CPT | Performed by: PHYSICIAN ASSISTANT

## 2024-03-29 RX ORDER — METHYLPREDNISOLONE 4 MG/1
TABLET ORAL
Qty: 21 TABLET | Refills: 0 | Status: SHIPPED | OUTPATIENT
Start: 2024-03-29 | End: 2024-04-05

## 2024-03-29 ASSESSMENT — ENCOUNTER SYMPTOMS
ALLERGIC/IMMUNOLOGIC NEGATIVE: 1
PSYCHIATRIC NEGATIVE: 1
RESPIRATORY NEGATIVE: 1
HEMATOLOGIC/LYMPHATIC NEGATIVE: 1
NUMBNESS: 0
ENDOCRINE NEGATIVE: 1
CONSTITUTIONAL NEGATIVE: 1
EYES NEGATIVE: 1
GASTROINTESTINAL NEGATIVE: 1
ARTHRALGIAS: 1
WEAKNESS: 0
CARDIOVASCULAR NEGATIVE: 1

## 2024-03-29 ASSESSMENT — PAIN SCALES - GENERAL: PAINLEVEL: 10-WORST PAIN EVER

## 2024-03-29 NOTE — TELEPHONE ENCOUNTER
Left shoulder sore, can't lift it. Has iced it and can only lift it a little.  She was wondering what she should do

## 2024-03-29 NOTE — PATIENT INSTRUCTIONS
Ice or heat 2-3 times a day  Range of motion exercises when pain lessens  Pcp follow up this week if not improving or worsening  ER visit anytime 24/7 for acute worsening or changing condition

## 2024-03-29 NOTE — PROGRESS NOTES
"Subjective   Patient ID: Caridad Rodriguez is a 84 y.o. female.      History provided by:  Patient   used: No    Shoulder Pain    This is a 84 yr old female here for left shoulder pain and decreased ROM x 1 day. No fall or injury known. No neck pain, radiating left arm pain, parasthesias, or weakness.     Review of Systems   Constitutional: Negative.    HENT: Negative.     Eyes: Negative.    Respiratory: Negative.     Cardiovascular: Negative.    Gastrointestinal: Negative.    Endocrine: Negative.    Genitourinary: Negative.    Musculoskeletal:  Positive for arthralgias.   Skin: Negative.    Allergic/Immunologic: Negative.    Neurological:  Negative for weakness and numbness.   Hematological: Negative.    Psychiatric/Behavioral: Negative.     All other systems reviewed and are negative.  /90   Pulse 79   Temp 36.4 °C (97.5 °F)   Resp 18   Ht 1.626 m (5' 4\")   Wt 101 kg (222 lb)   SpO2 96%   BMI 38.11 kg/m²     Objective   Physical Exam  Vitals and nursing note reviewed.   Constitutional:       Appearance: Normal appearance.   HENT:      Head: Normocephalic and atraumatic.   Cardiovascular:      Rate and Rhythm: Normal rate and regular rhythm.   Pulmonary:      Effort: Pulmonary effort is normal.      Breath sounds: Normal breath sounds.   Musculoskeletal:      Comments: Left shoulder-pain with palpation, limited FROM secondary to pain, distal n-v intact.   Skin:     General: Skin is warm and dry.   Neurological:      General: No focal deficit present.      Mental Status: She is alert and oriented to person, place, and time.   Psychiatric:         Mood and Affect: Mood normal.         Behavior: Behavior normal.     Assessment:  Left shoulder arthralgia    Plan:  Left shoulder xray negative by my read, pending radiology read, will call pt if different  Medrol dose adriano #1 taper as directed  Tylenol as directed for pain or fever  Ice or heat tid  Pcp follow up this week if not improving or " worsening  ER visit anytime 24/7 for acute worsening or changing condition

## 2024-04-02 ENCOUNTER — TELEPHONE (OUTPATIENT)
Dept: PRIMARY CARE | Facility: CLINIC | Age: 84
End: 2024-04-02
Payer: MEDICARE

## 2024-04-02 DIAGNOSIS — I48.91 ATRIAL FIBRILLATION, UNSPECIFIED TYPE (MULTI): ICD-10-CM

## 2024-04-02 NOTE — TELEPHONE ENCOUNTER
Patient called back and stated she was seen in the urgent care on Friday and they did x rays there and

## 2024-04-03 ENCOUNTER — OFFICE VISIT (OUTPATIENT)
Dept: PRIMARY CARE | Facility: CLINIC | Age: 84
End: 2024-04-03
Payer: MEDICARE

## 2024-04-03 VITALS
WEIGHT: 222 LBS | SYSTOLIC BLOOD PRESSURE: 193 MMHG | DIASTOLIC BLOOD PRESSURE: 85 MMHG | OXYGEN SATURATION: 93 % | HEIGHT: 64 IN | HEART RATE: 70 BPM | BODY MASS INDEX: 37.9 KG/M2

## 2024-04-03 DIAGNOSIS — E66.01 MORBID OBESITY (MULTI): ICD-10-CM

## 2024-04-03 DIAGNOSIS — J44.9 CHRONIC OBSTRUCTIVE PULMONARY DISEASE, UNSPECIFIED COPD TYPE (MULTI): ICD-10-CM

## 2024-04-03 DIAGNOSIS — I50.30 DIASTOLIC CONGESTIVE HEART FAILURE, UNSPECIFIED HF CHRONICITY (MULTI): ICD-10-CM

## 2024-04-03 DIAGNOSIS — I48.91 ATRIAL FIBRILLATION, UNSPECIFIED TYPE (MULTI): ICD-10-CM

## 2024-04-03 DIAGNOSIS — I10 PRIMARY HYPERTENSION: Primary | ICD-10-CM

## 2024-04-03 PROCEDURE — 3077F SYST BP >= 140 MM HG: CPT | Performed by: INTERNAL MEDICINE

## 2024-04-03 PROCEDURE — 1159F MED LIST DOCD IN RCRD: CPT | Performed by: INTERNAL MEDICINE

## 2024-04-03 PROCEDURE — 3079F DIAST BP 80-89 MM HG: CPT | Performed by: INTERNAL MEDICINE

## 2024-04-03 PROCEDURE — 99214 OFFICE O/P EST MOD 30 MIN: CPT | Performed by: INTERNAL MEDICINE

## 2024-04-03 RX ORDER — NEBIVOLOL 2.5 MG/1
2.5 TABLET ORAL DAILY
Qty: 30 TABLET | Refills: 11 | Status: SHIPPED | OUTPATIENT
Start: 2024-04-03 | End: 2024-04-09 | Stop reason: SDUPTHER

## 2024-04-03 NOTE — PROGRESS NOTES
Patient ID: Caridad oRdriguez is a 83 y.o. female who presents for the following    Chronic disease follow up     Medicare wellness 12/29/23   Assessment/Plan    COPD stable on trelgey   Responds well to the following  Prednisone 40mg x 5 day 20mg for 5 days  Azithromycin  Medications given upfront to prevent hospitalizations, patient is educated how to administer medications for her COPD exacerbation symptoms.   Mucinex 1200mg daily     COPD: spirometry  FEV1 49%, recommend once a day breo    she has a nebulizer at home. Continue Trelegy.       diastolic chf :  on 20mg lasix daily  Currently trace edema    Stopped 25mg hydrochlorothiazide daily in place of amlodipine 10mg daily (9/22/2023)     dry weight is 210lbs, currently patient is 222 lbs (she has been gaining weight)     - recommend lasix 20mg daily now (increased to 40mg x 2 days 4/3/2024)     Htn:   Start bystolic 2.5mg daily  (4/3/2024)  Lasix 40mg x 2 days   Follow up next week     morbid obesity: stable, recommend diet and weight loss     a-fib: continue on xeralto. stable, rate controlled on metoprolol      anemia noted hgb 9-10 check iron studies b12 folate and tsh (she denies any area of bleeding)     Needs RSV vaccine and covid booster up date   HPI   female with hemorrhoids, htn, hld, bilateral tka, hx sbo jan 2021 & Feb 2022, a-fib (on xarelto), diastolic CHF wants to follow up for      bronchitis vs COPD: patient  doing well. Has as needed inhaler      diastolic chf/ bilateral lower extremity edema: she has currently taken 20mg lasix daily. patient is on lisinopril 40mg daily, amlodipine 10mg daily.  Her current weight is 224lbs.       A-fib: patient is now on metoprolol 25mg bid and xarelto 20mg daily. echo september 2021 shows normal ef with elevated RVSP. she has mild bruising on her right forearm but no bleeding from xeralto      NO BLEEDING Colonoscopy: Took cologuard at home which was positive, Coloscopy done by Dr. Peña, states she doesn't  "need another colonoscopy now for the rest of her life     Mammogram: 6-7 year ago, no family hx of breast cancer     patient has great granddaughters      family medical history: both mom and dad  DM and stroke, brother  lung ca      surgery: open incisional hernia repair May 17, 2022 Dr Orona      HOSPITALIZATIONS   [facility] 2022 - 2022 for small bowel obstruction that resolved within the day. months later she had her hernia repaired in May 2022.       [facility]  to 2022 for severe diarrhea. abd/pelvic ct shows SBO. labs show uti. patient improved quicker then expected. she is sent with cipro going home and she is to follow up with general surgery for hernia repair     Visit Vitals  BP (!) 193/85 (BP Location: Left arm, Patient Position: Sitting, BP Cuff Size: Adult)   Pulse 70   Ht 1.626 m (5' 4\")   Wt 101 kg (222 lb)   SpO2 93%   BMI 38.11 kg/m²   Smoking Status Former   BSA 2.14 m²        PHYSICAL EXAM     General appearance: Alert and in no acute distress. speech is clear and coherent  HEENT: Sclera and conjunctiva white, EOMI, uvela midline, no mouth lesions. PERRLA,  nasal turbinates are not swollen without exudate. TM's Allen with cone of light, external ear canal with scant cerumen. No head trauma    Respiratory : No respiratory distress, normal respiratory rhythm and effort. Clear bilateral breath sounds. No wheezing or rhonchi.   Cardiovascular: heart rate regular, S1, S2. no murmurs. no Lower extremity edema  Skin inspection: Normal skin color and pigmentation, normal skin turgor and no visible rash, induration, or cellulitis  MSK: 5/5 strength upper and lower extremities without gait abnormalities. no loss of muscle mass   Neuro: 2-12 CN grossly intact.  no slurred speech. no lateralizing deficit  Psychiatric Orientation: Oriented to person, place, and time. no depression, homicidal or suicidal thoughts, normal affect   REVIEW OF SYSTEMS        Constitutional: not feeling " tired and no fever, chills or sweats. Denies weight loss     Cardiovascular: no exertional chest pain, no palpitations, no lower extremity edema   Lungs: Denies shortness of breath, exertional dyspnea, wheezing  Gastrointestinal: no change in bowel habits, no diarrhea, no nausea,    Musculoskeletal: no myalgias, no muscle weakness    Psychiatric: no depression and no anxiety.   Urine: denies polyuria, hematuria, dysuria   Endocrine: no cold intolerance, no heat intolerance         Allergies   Allergen Reactions    Adhesive Tape-Silicones Unknown    Latex Unknown    Metronidazole Unknown         Current Medications          Current Outpatient Medications   Medication Sig Dispense Refill    albuterol (Ventolin HFA) 90 mcg/actuation inhaler Inhale 2 puffs every 4 (four) hours if needed.        amLODIPine (Norvasc) 10 mg tablet Take 1 tablet (10 mg) by mouth 1 (one) time each day.        aspirin 81 mg capsule Take 1 tablet by mouth 1 (one) time each day.        fluticasone furoate-vilanteroL (Breo Ellipta) 100-25 mcg/dose inhaler Inhale 1 puff in the morning. After inhalation rinse mouth with water and spit. Use same time each day, no more than once in 24 hours.        furosemide (Lasix) 20 mg tablet Take 1 tablet (20 mg) by mouth 1 (one) time each day.        hydroCHLOROthiazide (HYDRODiuril) 25 mg tablet Take 1 tablet (25 mg) by mouth 1 (one) time each day.        lactulose 20 gram/30 mL oral solution Take 15 mL (10 g) by mouth every other day.        latanoprost (Xalatan) 0.005 % ophthalmic solution Latanoprost 0.005 % Ophthalmic Solution   Refills: 0         Start : 22-Apr-2019   Active  2.5 ML Bottle        lisinopril 40 mg tablet Take 1 tablet (40 mg) by mouth 1 (one) time each day.        magnesium hydroxide (Milk of Magnesia) 400 mg/5 mL suspension Milk of Magnesia 400 MG/5ML Oral Suspension   Refills: 0         Start : 19-Jan-2021   Active        metoprolol tartrate (Lopressor) 25 mg tablet Take 1 tablet (25  mg) by mouth in the morning and at bedtime.        omeprazole OTC (PriLOSEC OTC) 20 mg EC tablet Take 1 tablet (20 mg) by mouth 1 (one) time each day. Do not crush, chew, or split. (While on steroids)        polyethylene glycol (Miralax) 17 gram/dose powder MiraLax 17 GM/SCOOP Oral Powder   Refills: 0         Start : 30-Jul-2020   Active        predniSONE (Deltasone) 20 mg tablet Take 1 tablet (20 mg) by mouth 1 (one) time each day. Take 2 tablets for 5 days, then 1 tablet for 5 days for COPD exacerbation        rivaroxaban (Xarelto) 20 mg tablet Take 1 tablet (20 mg) by mouth 1 (one) time each day.        rosuvastatin (Crestor) 20 mg tablet Take 1 tablet (20 mg) by mouth 1 (one) time each day.        tiZANidine (Zanaflex) 4 mg tablet at bedtime. 1 tab po at night before sleep        triamcinolone (Kenalog) 0.1 % cream 3 times a day. APPLY SPARINGLY TO AFFECTED AREA(S) 3 TIMES A DAY          No current facility-administered medications for this visit.                  Objective           No visits with results within 4 Month(s) from this visit.   Latest known visit with results is:   Legacy Encounter on 10/18/2022   Component Date Value Ref Range Status    WBC, Urine 10/18/2022 928 (A)  0 - 5 /HPF Final    WBC Clumps, Urine 10/18/2022 OCC  /HPF Final    RBC, Urine 10/18/2022 73 (A)  0 - 5 /HPF Final    Squamous Epithelial Cells, Urine 10/18/2022 3  /HPF Final    Bacteria, Urine 10/18/2022 2+ (A)  /HPF Final    Mucus, Urine 10/18/2022 1+  /LPF Final    Color, Urine 10/18/2022 YELLOW  STRAW,YELLOW Final    Appearance, Urine 10/18/2022 HAZY  CLEAR Final    Specific Gravity, Urine 10/18/2022 1.011  1.005 - 1.035 Final    pH, Urine 10/18/2022 5.0  5.0 - 8.0 Final    Protein, Urine 10/18/2022 30 (1+) (A)  NEGATIVE mg/dL Final    Glucose, Urine 10/18/2022 NEGATIVE  NEGATIVE mg/dL Final    Blood, Urine 10/18/2022 LARGE (3+) (A)  NEGATIVE Final    Ketones, Urine 10/18/2022 NEGATIVE  NEGATIVE mg/dL Final    Bilirubin, Urine  10/18/2022 NEGATIVE  NEGATIVE Final    Urobilinogen, Urine 10/18/2022 <2.0  0.0 - 1.9 mg/dL Final    Nitrite, Urine 10/18/2022 NEGATIVE  NEGATIVE Final    Leukocyte Esterase, Urine 10/18/2022 LARGE (3+) (A)  NEGATIVE Final         Radiology: Reviewed imaging in powerchart.  No results found.     Family History          Family History   Problem Relation Name Age of Onset    Hypertension Mother        Stroke Mother        Hypertension Father        Stroke Father        Lung cancer Brother             Social History   Social History            Socioeconomic History    Marital status:        Spouse name: None    Number of children: None    Years of education: None    Highest education level: None   Occupational History    None   Tobacco Use    Smoking status: Former       Types: Cigarettes    Smokeless tobacco: Former   Vaping Use    Vaping status: None   Substance and Sexual Activity    Alcohol use: None    Drug use: None    Sexual activity: None   Other Topics Concern    None   Social History Narrative    None      Social Determinants of Health      Financial Resource Strain: Not on file   Food Insecurity: Not on file   Transportation Needs: Not on file   Physical Activity: Not on file   Stress: Not on file   Social Connections: Not on file   Intimate Partner Violence: Not on file   Housing Stability: Not on file         Medical History        Past Medical History:   Diagnosis Date    Diverticulitis of intestine, part unspecified, without perforation or abscess without bleeding 05/30/2019     Dvtrcli of intest, part unsp, w/o perf or abscess w/o bleed    Other conditions influencing health status 05/30/2019     Cystocele    Personal history of other diseases of the circulatory system       History of hypertension    Personal history of other diseases of the musculoskeletal system and connective tissue       History of osteoarthritis    Personal history of other diseases of the nervous system and sense organs        History of carpal tunnel syndrome    Personal history of urinary (tract) infections 10/10/2017     History of urinary tract infection         Surgical History         Past Surgical History:   Procedure Laterality Date    APPENDECTOMY   05/30/2019     Appendectomy    CHOLECYSTECTOMY   05/30/2019     Cholecystectomy    COLON SURGERY   05/30/2019     Colon Surgery    HERNIA REPAIR   05/30/2019     Hernia Repair    LUNG SURGERY   05/30/2019     Lung Surgery    MR HEAD ANGIO WO IV CONTRAST   4/23/2016     MR HEAD ANGIO WO IV CONTRAST 4/23/2016 [facility] INPATIENT LEGACY    OTHER SURGICAL HISTORY   05/30/2019     Excision of basal cell carcinoma    OTHER SURGICAL HISTORY   05/30/2019     Hemorrhoidectomy    TONSILLECTOMY   05/30/2019     Tonsillectomy    TOTAL HIP ARTHROPLASTY   05/30/2019     Hip Replacement    TOTAL KNEE ARTHROPLASTY   05/30/2019     Knee Replacement

## 2024-04-09 ENCOUNTER — OFFICE VISIT (OUTPATIENT)
Dept: PRIMARY CARE | Facility: CLINIC | Age: 84
End: 2024-04-09
Payer: MEDICARE

## 2024-04-09 VITALS
HEIGHT: 64 IN | HEART RATE: 79 BPM | WEIGHT: 222 LBS | SYSTOLIC BLOOD PRESSURE: 145 MMHG | BODY MASS INDEX: 37.9 KG/M2 | DIASTOLIC BLOOD PRESSURE: 81 MMHG | OXYGEN SATURATION: 96 %

## 2024-04-09 DIAGNOSIS — I10 PRIMARY HYPERTENSION: ICD-10-CM

## 2024-04-09 DIAGNOSIS — J44.9 CHRONIC OBSTRUCTIVE PULMONARY DISEASE, UNSPECIFIED COPD TYPE (MULTI): ICD-10-CM

## 2024-04-09 DIAGNOSIS — I48.91 ATRIAL FIBRILLATION, UNSPECIFIED TYPE (MULTI): ICD-10-CM

## 2024-04-09 PROCEDURE — 1036F TOBACCO NON-USER: CPT | Performed by: INTERNAL MEDICINE

## 2024-04-09 PROCEDURE — 99213 OFFICE O/P EST LOW 20 MIN: CPT | Performed by: INTERNAL MEDICINE

## 2024-04-09 PROCEDURE — 1159F MED LIST DOCD IN RCRD: CPT | Performed by: INTERNAL MEDICINE

## 2024-04-09 PROCEDURE — 3077F SYST BP >= 140 MM HG: CPT | Performed by: INTERNAL MEDICINE

## 2024-04-09 PROCEDURE — 3079F DIAST BP 80-89 MM HG: CPT | Performed by: INTERNAL MEDICINE

## 2024-04-09 RX ORDER — FLUTICASONE FUROATE, UMECLIDINIUM BROMIDE AND VILANTEROL TRIFENATATE 100; 62.5; 25 UG/1; UG/1; UG/1
1 POWDER RESPIRATORY (INHALATION) DAILY
Qty: 1 EACH | Refills: 11 | Status: SHIPPED | OUTPATIENT
Start: 2024-04-09

## 2024-04-09 RX ORDER — NEBIVOLOL 5 MG/1
5 TABLET ORAL DAILY
Qty: 90 TABLET | Refills: 3 | Status: SHIPPED | OUTPATIENT
Start: 2024-04-09 | End: 2025-04-09

## 2024-04-09 NOTE — PROGRESS NOTES
Patient ID: Caridad Rodriguez is a 83 y.o. female who presents for the following    Chronic disease follow up     Medicare wellness 12/29/23   Assessment/Plan    COPD - stable on trelgey   Responds well to the following  Prednisone 40mg x 5 day 20mg for 5 days  Azithromycin  Medications given upfront to prevent hospitalizations, patient is educated how to administer medications for her COPD exacerbation symptoms.   Mucinex 1200mg daily     COPD: spirometry  FEV1 49%, recommend once a day breo    she has a nebulizer at home. Continue Trelegy.       diastolic chf :  on 20mg lasix daily  Currently trace edema    Stopped 25mg hydrochlorothiazide daily in place of amlodipine 10mg daily (9/22/2023)     dry weight is 210lbs, currently patient is 222 lbs (she has been gaining weight)     - recommend lasix 20mg daily now (increased to 40mg x 2 days 4/3/2024)     Htn:   Start bystolic 2.5mg daily  (4/3/2024) --> 5 mg (4/9/2024)  Lasix 40mg x 2 days   Follow up next week     morbid obesity: stable, recommend diet and weight loss     a-fib: continue on xeralto. stable, rate controlled on metoprolol      anemia noted hgb 9-10 check iron studies b12 folate and tsh (she denies any area of bleeding)     Needs RSV vaccine and covid booster up date   HPI   female with hemorrhoids, htn, hld, bilateral tka, hx sbo jan 2021 & Feb 2022, a-fib (on xarelto), diastolic CHF wants to follow up for      bronchitis vs COPD: patient  doing well. Has as needed inhaler      diastolic chf/ bilateral lower extremity edema: she has currently taken 20mg lasix daily. patient is on lisinopril 40mg daily, amlodipine 10mg daily.  Her current weight is 224lbs.       A-fib: patient is now on metoprolol 25mg bid and xarelto 20mg daily. echo september 2021 shows normal ef with elevated RVSP. she has mild bruising on her right forearm but no bleeding from xeralto      NO BLEEDING Colonoscopy: Took cologuard at home which was positive, Coloscopy done by   "Mariana, states she doesn't need another colonoscopy now for the rest of her life     Mammogram: 6-7 year ago, no family hx of breast cancer     patient has great granddaughters      family medical history: both mom and dad  DM and stroke, brother  lung ca      surgery: open incisional hernia repair May 17, 2022 Dr Orona      HOSPITALIZATIONS   [facility] 2022 - 2022 for small bowel obstruction that resolved within the day. months later she had her hernia repaired in May 2022.       [facility]  to 2022 for severe diarrhea. abd/pelvic ct shows SBO. labs show uti. patient improved quicker then expected. she is sent with cipro going home and she is to follow up with general surgery for hernia repair     Visit Vitals  /81 (BP Location: Left arm, Patient Position: Sitting, BP Cuff Size: Large adult)   Pulse 79   Ht 1.626 m (5' 4\")   Wt 101 kg (222 lb)   SpO2 96%   BMI 38.11 kg/m²   Smoking Status Former   BSA 2.14 m²        PHYSICAL EXAM     General appearance: Alert and in no acute distress. speech is clear and coherent  HEENT: Sclera and conjunctiva white, EOMI,   Respiratory : No respiratory distress, normal respiratory rhythm and effort. Clear bilateral breath sounds. No wheezing or rhonchi.   Cardiovascular: heart rate regular, S1, S2. no murmurs. no Lower extremity edema  MSK: 5/5 strength upper and lower extremities without gait abnormalities. no loss of muscle mass   Neuro: 2-12 CN grossly intact.  no slurred speech. no lateralizing deficit  Psychiatric Orientation: Oriented to person, place, and time. no depression, homicidal or suicidal thoughts, normal affect   REVIEW OF SYSTEMS        Constitutional: not feeling tired and no fever, chills or sweats. Denies weight loss     Cardiovascular: no exertional chest pain, no palpitations, no lower extremity edema   Lungs: Denies shortness of breath, exertional dyspnea, wheezing  Gastrointestinal: no change in bowel habits, no " diarrhea, no nausea,    Musculoskeletal: no myalgias, no muscle weakness    Psychiatric: no depression and no anxiety.          Allergies   Allergen Reactions    Adhesive Tape-Silicones Unknown    Latex Unknown    Metronidazole Unknown         Current Medications          Current Outpatient Medications   Medication Sig Dispense Refill    albuterol (Ventolin HFA) 90 mcg/actuation inhaler Inhale 2 puffs every 4 (four) hours if needed.        amLODIPine (Norvasc) 10 mg tablet Take 1 tablet (10 mg) by mouth 1 (one) time each day.        aspirin 81 mg capsule Take 1 tablet by mouth 1 (one) time each day.        fluticasone furoate-vilanteroL (Breo Ellipta) 100-25 mcg/dose inhaler Inhale 1 puff in the morning. After inhalation rinse mouth with water and spit. Use same time each day, no more than once in 24 hours.        furosemide (Lasix) 20 mg tablet Take 1 tablet (20 mg) by mouth 1 (one) time each day.        hydroCHLOROthiazide (HYDRODiuril) 25 mg tablet Take 1 tablet (25 mg) by mouth 1 (one) time each day.        lactulose 20 gram/30 mL oral solution Take 15 mL (10 g) by mouth every other day.        latanoprost (Xalatan) 0.005 % ophthalmic solution Latanoprost 0.005 % Ophthalmic Solution   Refills: 0         Start : 22-Apr-2019   Active  2.5 ML Bottle        lisinopril 40 mg tablet Take 1 tablet (40 mg) by mouth 1 (one) time each day.        magnesium hydroxide (Milk of Magnesia) 400 mg/5 mL suspension Milk of Magnesia 400 MG/5ML Oral Suspension   Refills: 0         Start : 19-Jan-2021   Active        metoprolol tartrate (Lopressor) 25 mg tablet Take 1 tablet (25 mg) by mouth in the morning and at bedtime.        omeprazole OTC (PriLOSEC OTC) 20 mg EC tablet Take 1 tablet (20 mg) by mouth 1 (one) time each day. Do not crush, chew, or split. (While on steroids)        polyethylene glycol (Miralax) 17 gram/dose powder MiraLax 17 GM/SCOOP Oral Powder   Refills: 0         Start : 30-Jul-2020   Active        predniSONE  (Deltasone) 20 mg tablet Take 1 tablet (20 mg) by mouth 1 (one) time each day. Take 2 tablets for 5 days, then 1 tablet for 5 days for COPD exacerbation        rivaroxaban (Xarelto) 20 mg tablet Take 1 tablet (20 mg) by mouth 1 (one) time each day.        rosuvastatin (Crestor) 20 mg tablet Take 1 tablet (20 mg) by mouth 1 (one) time each day.        tiZANidine (Zanaflex) 4 mg tablet at bedtime. 1 tab po at night before sleep        triamcinolone (Kenalog) 0.1 % cream 3 times a day. APPLY SPARINGLY TO AFFECTED AREA(S) 3 TIMES A DAY          No current facility-administered medications for this visit.                  Objective           No visits with results within 4 Month(s) from this visit.   Latest known visit with results is:   Legacy Encounter on 10/18/2022   Component Date Value Ref Range Status    WBC, Urine 10/18/2022 928 (A)  0 - 5 /HPF Final    WBC Clumps, Urine 10/18/2022 OCC  /HPF Final    RBC, Urine 10/18/2022 73 (A)  0 - 5 /HPF Final    Squamous Epithelial Cells, Urine 10/18/2022 3  /HPF Final    Bacteria, Urine 10/18/2022 2+ (A)  /HPF Final    Mucus, Urine 10/18/2022 1+  /LPF Final    Color, Urine 10/18/2022 YELLOW  STRAW,YELLOW Final    Appearance, Urine 10/18/2022 HAZY  CLEAR Final    Specific Gravity, Urine 10/18/2022 1.011  1.005 - 1.035 Final    pH, Urine 10/18/2022 5.0  5.0 - 8.0 Final    Protein, Urine 10/18/2022 30 (1+) (A)  NEGATIVE mg/dL Final    Glucose, Urine 10/18/2022 NEGATIVE  NEGATIVE mg/dL Final    Blood, Urine 10/18/2022 LARGE (3+) (A)  NEGATIVE Final    Ketones, Urine 10/18/2022 NEGATIVE  NEGATIVE mg/dL Final    Bilirubin, Urine 10/18/2022 NEGATIVE  NEGATIVE Final    Urobilinogen, Urine 10/18/2022 <2.0  0.0 - 1.9 mg/dL Final    Nitrite, Urine 10/18/2022 NEGATIVE  NEGATIVE Final    Leukocyte Esterase, Urine 10/18/2022 LARGE (3+) (A)  NEGATIVE Final         Radiology: Reviewed imaging in powerchart.  No results found.     Family History          Family History   Problem Relation Name  Age of Onset    Hypertension Mother        Stroke Mother        Hypertension Father        Stroke Father        Lung cancer Brother             Social History   Social History            Socioeconomic History    Marital status:        Spouse name: None    Number of children: None    Years of education: None    Highest education level: None   Occupational History    None   Tobacco Use    Smoking status: Former       Types: Cigarettes    Smokeless tobacco: Former   Vaping Use    Vaping status: None   Substance and Sexual Activity    Alcohol use: None    Drug use: None    Sexual activity: None   Other Topics Concern    None   Social History Narrative    None      Social Determinants of Health      Financial Resource Strain: Not on file   Food Insecurity: Not on file   Transportation Needs: Not on file   Physical Activity: Not on file   Stress: Not on file   Social Connections: Not on file   Intimate Partner Violence: Not on file   Housing Stability: Not on file         Medical History        Past Medical History:   Diagnosis Date    Diverticulitis of intestine, part unspecified, without perforation or abscess without bleeding 05/30/2019     Dvtrcli of intest, part unsp, w/o perf or abscess w/o bleed    Other conditions influencing health status 05/30/2019     Cystocele    Personal history of other diseases of the circulatory system       History of hypertension    Personal history of other diseases of the musculoskeletal system and connective tissue       History of osteoarthritis    Personal history of other diseases of the nervous system and sense organs       History of carpal tunnel syndrome    Personal history of urinary (tract) infections 10/10/2017     History of urinary tract infection         Surgical History         Past Surgical History:   Procedure Laterality Date    APPENDECTOMY   05/30/2019     Appendectomy    CHOLECYSTECTOMY   05/30/2019     Cholecystectomy    COLON SURGERY   05/30/2019     Colon  Surgery    HERNIA REPAIR   05/30/2019     Hernia Repair    LUNG SURGERY   05/30/2019     Lung Surgery    MR HEAD ANGIO WO IV CONTRAST   4/23/2016     MR HEAD ANGIO WO IV CONTRAST 4/23/2016 [facility] INPATIENT LEGACY    OTHER SURGICAL HISTORY   05/30/2019     Excision of basal cell carcinoma    OTHER SURGICAL HISTORY   05/30/2019     Hemorrhoidectomy    TONSILLECTOMY   05/30/2019     Tonsillectomy    TOTAL HIP ARTHROPLASTY   05/30/2019     Hip Replacement    TOTAL KNEE ARTHROPLASTY   05/30/2019     Knee Replacement

## 2024-04-15 ENCOUNTER — TELEPHONE (OUTPATIENT)
Dept: PRIMARY CARE | Facility: CLINIC | Age: 84
End: 2024-04-15
Payer: MEDICARE

## 2024-04-15 NOTE — TELEPHONE ENCOUNTER
PT WAS NOT GIVEN NEBIVOLOL BY PHARMACY BECAUSE SHE TAKES METOPROLOL. PLEASE ADVISE. WAS TOLD SHOULD BE ONE OR THE OTHER

## 2024-04-29 ENCOUNTER — TELEPHONE (OUTPATIENT)
Dept: PRIMARY CARE | Facility: CLINIC | Age: 84
End: 2024-04-29
Payer: MEDICARE

## 2024-04-29 DIAGNOSIS — M62.838 MUSCLE SPASM: Primary | ICD-10-CM

## 2024-04-29 RX ORDER — TIZANIDINE 4 MG/1
4 TABLET ORAL NIGHTLY
Qty: 90 TABLET | Refills: 3 | Status: SHIPPED | OUTPATIENT
Start: 2024-04-29

## 2024-05-01 ENCOUNTER — OFFICE VISIT (OUTPATIENT)
Dept: PRIMARY CARE | Facility: CLINIC | Age: 84
End: 2024-05-01
Payer: MEDICARE

## 2024-05-01 VITALS
SYSTOLIC BLOOD PRESSURE: 145 MMHG | HEIGHT: 64 IN | OXYGEN SATURATION: 97 % | DIASTOLIC BLOOD PRESSURE: 54 MMHG | WEIGHT: 224 LBS | HEART RATE: 72 BPM | BODY MASS INDEX: 38.24 KG/M2

## 2024-05-01 DIAGNOSIS — K59.00 CONSTIPATION, UNSPECIFIED CONSTIPATION TYPE: Primary | ICD-10-CM

## 2024-05-01 PROCEDURE — 99213 OFFICE O/P EST LOW 20 MIN: CPT | Performed by: INTERNAL MEDICINE

## 2024-05-01 PROCEDURE — 1036F TOBACCO NON-USER: CPT | Performed by: INTERNAL MEDICINE

## 2024-05-01 PROCEDURE — 3078F DIAST BP <80 MM HG: CPT | Performed by: INTERNAL MEDICINE

## 2024-05-01 PROCEDURE — 1159F MED LIST DOCD IN RCRD: CPT | Performed by: INTERNAL MEDICINE

## 2024-05-01 PROCEDURE — 3077F SYST BP >= 140 MM HG: CPT | Performed by: INTERNAL MEDICINE

## 2024-05-01 RX ORDER — LACTULOSE 10 G/15ML
10 SOLUTION ORAL; RECTAL 2 TIMES DAILY
Qty: 237 ML | Refills: 1 | Status: SHIPPED | OUTPATIENT
Start: 2024-05-01

## 2024-05-01 NOTE — PROGRESS NOTES
Patient ID: Caridad Rodriguez is a 83 y.o. female who presents for the following    Chronic disease follow up     Medicare wellness 12/29/23   Assessment/Plan   Constipation: lactulose bid and hydration  Abdominal xray stat    Other medical issues, see below   COPD - stable on trelgey   Responds well to the following  Prednisone 40mg x 5 day 20mg for 5 days  Azithromycin  Medications given upfront to prevent hospitalizations, patient is educated how to administer medications for her COPD exacerbation symptoms.   Mucinex 1200mg daily     COPD: spirometry  FEV1 49%, recommend once a day breo    she has a nebulizer at home. Continue Trelegy.       diastolic chf :  on 20mg lasix daily  Currently trace edema    Stopped 25mg hydrochlorothiazide daily in place of amlodipine 10mg daily (9/22/2023)     dry weight is 210lbs, currently patient is 222 lbs (she has been gaining weight)     - recommend lasix 20mg daily now (increased to 40mg x 2 days 4/3/2024)     Htn:   Start bystolic 2.5mg daily  (4/3/2024) --> 5 mg (4/9/2024)  Lasix 40mg x 2 days   Follow up next week     morbid obesity: stable, recommend diet and weight loss     a-fib: continue on xeralto. stable, rate controlled on metoprolol      anemia noted hgb 9-10 check iron studies b12 folate and tsh (she denies any area of bleeding)     Needs RSV vaccine and covid booster up date   HPI   female with hemorrhoids, htn, hld, bilateral tka, hx sbo jan 2021 & Feb 2022, a-fib (on xarelto), diastolic CHF wants to follow up for      Constipation: Patient has had a couple of days of constipation. She says that she has tried mirlax and milk of magnesia without help. She says that now she has a dull ache in her right lower quadrant (she does not have an appendix). She denies any fever or sweats.     Other medical issues, see below   bronchitis vs COPD: patient  doing well. Has as needed inhaler      diastolic chf/ bilateral lower extremity edema: she has currently taken 20mg lasix  "daily. patient is on lisinopril 40mg daily, amlodipine 10mg daily.  Her current weight is 224lbs.       A-fib: patient is now on metoprolol 25mg bid and xarelto 20mg daily. echo 2021 shows normal ef with elevated RVSP. she has mild bruising on her right forearm but no bleeding from xeralto      NO BLEEDING Colonoscopy: Took cologuard at home which was positive, Coloscopy done by Dr. Peña, states she doesn't need another colonoscopy now for the rest of her life     Mammogram: 6-7 year ago, no family hx of breast cancer     patient has great granddaughters      family medical history: both mom and dad  DM and stroke, brother  lung ca      surgery: open incisional hernia repair May 17, 2022 Dr Orona      HOSPITALIZATIONS   [facility] 2022 - 2022 for small bowel obstruction that resolved within the day. months later she had her hernia repaired in May 2022.       [facility]  to 2022 for severe diarrhea. abd/pelvic ct shows SBO. labs show uti. patient improved quicker then expected. she is sent with cipro going home and she is to follow up with general surgery for hernia repair     Visit Vitals  /54   Pulse 72   Ht 1.626 m (5' 4\")   Wt 102 kg (224 lb)   SpO2 97%   BMI 38.45 kg/m²   Smoking Status Former   BSA 2.15 m²        PHYSICAL EXAM     General appearance: Alert and in no acute distress. speech is clear and coherent  HEENT: Sclera and conjunctiva white, EOMI,     Respiratory : No respiratory distress, normal respiratory rhythm and effort. Clear bilateral breath sounds. No wheezing or rhonchi.   Cardiovascular: heart rate regular, S1, S2. no murmurs. no Lower extremity edema    Neuro: 2-12 CN grossly intact.  no slurred speech. no lateralizing deficit  Psychiatric Orientation: Oriented to person, place, and time. no depression, homicidal or suicidal thoughts, normal affect  Abdomen: soft, none tender, none distended. no organomegaly    REVIEW OF SYSTEMS        " Constitutional: not feeling tired and no fever, chills or sweats. Denies weight loss     Cardiovascular: no exertional chest pain, no palpitations, no lower extremity edema   Lungs: Denies shortness of breath, exertional dyspnea, wheezing  Gastrointestinal: no change in bowel habits, no diarrhea, no nausea,    Musculoskeletal: no myalgias, no muscle weakness    Psychiatric: no depression and no anxiety.          Allergies   Allergen Reactions    Adhesive Tape-Silicones Unknown    Latex Unknown    Metronidazole Unknown         Current Medications          Current Outpatient Medications   Medication Sig Dispense Refill    albuterol (Ventolin HFA) 90 mcg/actuation inhaler Inhale 2 puffs every 4 (four) hours if needed.        amLODIPine (Norvasc) 10 mg tablet Take 1 tablet (10 mg) by mouth 1 (one) time each day.        aspirin 81 mg capsule Take 1 tablet by mouth 1 (one) time each day.        fluticasone furoate-vilanteroL (Breo Ellipta) 100-25 mcg/dose inhaler Inhale 1 puff in the morning. After inhalation rinse mouth with water and spit. Use same time each day, no more than once in 24 hours.        furosemide (Lasix) 20 mg tablet Take 1 tablet (20 mg) by mouth 1 (one) time each day.        hydroCHLOROthiazide (HYDRODiuril) 25 mg tablet Take 1 tablet (25 mg) by mouth 1 (one) time each day.        lactulose 20 gram/30 mL oral solution Take 15 mL (10 g) by mouth every other day.        latanoprost (Xalatan) 0.005 % ophthalmic solution Latanoprost 0.005 % Ophthalmic Solution   Refills: 0         Start : 22-Apr-2019   Active  2.5 ML Bottle        lisinopril 40 mg tablet Take 1 tablet (40 mg) by mouth 1 (one) time each day.        magnesium hydroxide (Milk of Magnesia) 400 mg/5 mL suspension Milk of Magnesia 400 MG/5ML Oral Suspension   Refills: 0         Start : 19-Jan-2021   Active        metoprolol tartrate (Lopressor) 25 mg tablet Take 1 tablet (25 mg) by mouth in the morning and at bedtime.        omeprazole OTC  (PriLOSEC OTC) 20 mg EC tablet Take 1 tablet (20 mg) by mouth 1 (one) time each day. Do not crush, chew, or split. (While on steroids)        polyethylene glycol (Miralax) 17 gram/dose powder MiraLax 17 GM/SCOOP Oral Powder   Refills: 0         Start : 30-Jul-2020   Active        predniSONE (Deltasone) 20 mg tablet Take 1 tablet (20 mg) by mouth 1 (one) time each day. Take 2 tablets for 5 days, then 1 tablet for 5 days for COPD exacerbation        rivaroxaban (Xarelto) 20 mg tablet Take 1 tablet (20 mg) by mouth 1 (one) time each day.        rosuvastatin (Crestor) 20 mg tablet Take 1 tablet (20 mg) by mouth 1 (one) time each day.        tiZANidine (Zanaflex) 4 mg tablet at bedtime. 1 tab po at night before sleep        triamcinolone (Kenalog) 0.1 % cream 3 times a day. APPLY SPARINGLY TO AFFECTED AREA(S) 3 TIMES A DAY          No current facility-administered medications for this visit.                  Objective           No visits with results within 4 Month(s) from this visit.   Latest known visit with results is:   Legacy Encounter on 10/18/2022   Component Date Value Ref Range Status    WBC, Urine 10/18/2022 928 (A)  0 - 5 /HPF Final    WBC Clumps, Urine 10/18/2022 OCC  /HPF Final    RBC, Urine 10/18/2022 73 (A)  0 - 5 /HPF Final    Squamous Epithelial Cells, Urine 10/18/2022 3  /HPF Final    Bacteria, Urine 10/18/2022 2+ (A)  /HPF Final    Mucus, Urine 10/18/2022 1+  /LPF Final    Color, Urine 10/18/2022 YELLOW  STRAW,YELLOW Final    Appearance, Urine 10/18/2022 HAZY  CLEAR Final    Specific Gravity, Urine 10/18/2022 1.011  1.005 - 1.035 Final    pH, Urine 10/18/2022 5.0  5.0 - 8.0 Final    Protein, Urine 10/18/2022 30 (1+) (A)  NEGATIVE mg/dL Final    Glucose, Urine 10/18/2022 NEGATIVE  NEGATIVE mg/dL Final    Blood, Urine 10/18/2022 LARGE (3+) (A)  NEGATIVE Final    Ketones, Urine 10/18/2022 NEGATIVE  NEGATIVE mg/dL Final    Bilirubin, Urine 10/18/2022 NEGATIVE  NEGATIVE Final    Urobilinogen, Urine  10/18/2022 <2.0  0.0 - 1.9 mg/dL Final    Nitrite, Urine 10/18/2022 NEGATIVE  NEGATIVE Final    Leukocyte Esterase, Urine 10/18/2022 LARGE (3+) (A)  NEGATIVE Final         Radiology: Reviewed imaging in powerchart.  No results found.     Family History          Family History   Problem Relation Name Age of Onset    Hypertension Mother        Stroke Mother        Hypertension Father        Stroke Father        Lung cancer Brother             Social History   Social History            Socioeconomic History    Marital status:        Spouse name: None    Number of children: None    Years of education: None    Highest education level: None   Occupational History    None   Tobacco Use    Smoking status: Former       Types: Cigarettes    Smokeless tobacco: Former   Vaping Use    Vaping status: None   Substance and Sexual Activity    Alcohol use: None    Drug use: None    Sexual activity: None   Other Topics Concern    None   Social History Narrative    None      Social Determinants of Health      Financial Resource Strain: Not on file   Food Insecurity: Not on file   Transportation Needs: Not on file   Physical Activity: Not on file   Stress: Not on file   Social Connections: Not on file   Intimate Partner Violence: Not on file   Housing Stability: Not on file         Medical History        Past Medical History:   Diagnosis Date    Diverticulitis of intestine, part unspecified, without perforation or abscess without bleeding 05/30/2019     Dvtrcli of intest, part unsp, w/o perf or abscess w/o bleed    Other conditions influencing health status 05/30/2019     Cystocele    Personal history of other diseases of the circulatory system       History of hypertension    Personal history of other diseases of the musculoskeletal system and connective tissue       History of osteoarthritis    Personal history of other diseases of the nervous system and sense organs       History of carpal tunnel syndrome    Personal history  of urinary (tract) infections 10/10/2017     History of urinary tract infection         Surgical History         Past Surgical History:   Procedure Laterality Date    APPENDECTOMY   05/30/2019     Appendectomy    CHOLECYSTECTOMY   05/30/2019     Cholecystectomy    COLON SURGERY   05/30/2019     Colon Surgery    HERNIA REPAIR   05/30/2019     Hernia Repair    LUNG SURGERY   05/30/2019     Lung Surgery    MR HEAD ANGIO WO IV CONTRAST   4/23/2016     MR HEAD ANGIO WO IV CONTRAST 4/23/2016 [facility] INPATIENT LEGACY    OTHER SURGICAL HISTORY   05/30/2019     Excision of basal cell carcinoma    OTHER SURGICAL HISTORY   05/30/2019     Hemorrhoidectomy    TONSILLECTOMY   05/30/2019     Tonsillectomy    TOTAL HIP ARTHROPLASTY   05/30/2019     Hip Replacement    TOTAL KNEE ARTHROPLASTY   05/30/2019     Knee Replacement

## 2024-05-23 ENCOUNTER — TELEPHONE (OUTPATIENT)
Dept: PRIMARY CARE | Facility: CLINIC | Age: 84
End: 2024-05-23
Payer: MEDICARE

## 2024-05-23 NOTE — TELEPHONE ENCOUNTER
PT said her daughter in law's father is 92 and was at a birthday party and sense has been told he has TB.  PT is concerned she was exposed to him.      Should she be tested?

## 2024-06-07 ENCOUNTER — TELEPHONE (OUTPATIENT)
Dept: PRIMARY CARE | Facility: CLINIC | Age: 84
End: 2024-06-07
Payer: MEDICARE

## 2024-06-24 DIAGNOSIS — K59.00 CONSTIPATION, UNSPECIFIED CONSTIPATION TYPE: ICD-10-CM

## 2024-06-24 RX ORDER — LACTULOSE 10 G/15ML
10 SOLUTION ORAL; RECTAL 2 TIMES DAILY
Qty: 237 ML | Refills: 1 | Status: SHIPPED | OUTPATIENT
Start: 2024-06-24

## 2024-07-09 ENCOUNTER — APPOINTMENT (OUTPATIENT)
Dept: PRIMARY CARE | Facility: CLINIC | Age: 84
End: 2024-07-09
Payer: MEDICARE

## 2024-07-09 ENCOUNTER — LAB (OUTPATIENT)
Dept: LAB | Facility: LAB | Age: 84
End: 2024-07-09
Payer: MEDICARE

## 2024-07-09 VITALS
HEART RATE: 71 BPM | BODY MASS INDEX: 37.56 KG/M2 | SYSTOLIC BLOOD PRESSURE: 156 MMHG | HEIGHT: 64 IN | OXYGEN SATURATION: 94 % | DIASTOLIC BLOOD PRESSURE: 75 MMHG | WEIGHT: 220 LBS

## 2024-07-09 DIAGNOSIS — J44.9 CHRONIC OBSTRUCTIVE PULMONARY DISEASE, UNSPECIFIED COPD TYPE (MULTI): ICD-10-CM

## 2024-07-09 DIAGNOSIS — E55.9 VITAMIN D DEFICIENCY: ICD-10-CM

## 2024-07-09 DIAGNOSIS — I48.91 ATRIAL FIBRILLATION, UNSPECIFIED TYPE (MULTI): ICD-10-CM

## 2024-07-09 DIAGNOSIS — I10 PRIMARY HYPERTENSION: ICD-10-CM

## 2024-07-09 DIAGNOSIS — E78.5 HYPERLIPIDEMIA, UNSPECIFIED HYPERLIPIDEMIA TYPE: ICD-10-CM

## 2024-07-09 DIAGNOSIS — E78.5 HYPERLIPIDEMIA, UNSPECIFIED HYPERLIPIDEMIA TYPE: Primary | ICD-10-CM

## 2024-07-09 LAB
25(OH)D3 SERPL-MCNC: 54 NG/ML (ref 30–100)
ALBUMIN SERPL BCP-MCNC: 4.3 G/DL (ref 3.4–5)
ALP SERPL-CCNC: 87 U/L (ref 33–136)
ALT SERPL W P-5'-P-CCNC: 12 U/L (ref 7–45)
ANION GAP SERPL CALC-SCNC: 13 MMOL/L (ref 10–20)
AST SERPL W P-5'-P-CCNC: 17 U/L (ref 9–39)
BILIRUB SERPL-MCNC: 0.5 MG/DL (ref 0–1.2)
BUN SERPL-MCNC: 28 MG/DL (ref 6–23)
CALCIUM SERPL-MCNC: 10 MG/DL (ref 8.6–10.6)
CHLORIDE SERPL-SCNC: 104 MMOL/L (ref 98–107)
CHOLEST SERPL-MCNC: 114 MG/DL (ref 0–199)
CHOLESTEROL/HDL RATIO: 1.8
CO2 SERPL-SCNC: 29 MMOL/L (ref 21–32)
CREAT SERPL-MCNC: 1 MG/DL (ref 0.5–1.05)
EGFRCR SERPLBLD CKD-EPI 2021: 56 ML/MIN/1.73M*2
ERYTHROCYTE [DISTWIDTH] IN BLOOD BY AUTOMATED COUNT: 13.5 % (ref 11.5–14.5)
GLUCOSE SERPL-MCNC: 90 MG/DL (ref 74–99)
HCT VFR BLD AUTO: 39.3 % (ref 36–46)
HDLC SERPL-MCNC: 62.5 MG/DL
HGB BLD-MCNC: 12.6 G/DL (ref 12–16)
LDLC SERPL CALC-MCNC: 40 MG/DL
MCH RBC QN AUTO: 30.4 PG (ref 26–34)
MCHC RBC AUTO-ENTMCNC: 32.1 G/DL (ref 32–36)
MCV RBC AUTO: 95 FL (ref 80–100)
NON HDL CHOLESTEROL: 52 MG/DL (ref 0–149)
NRBC BLD-RTO: 0 /100 WBCS (ref 0–0)
PLATELET # BLD AUTO: 247 X10*3/UL (ref 150–450)
POTASSIUM SERPL-SCNC: 4.6 MMOL/L (ref 3.5–5.3)
PROT SERPL-MCNC: 7 G/DL (ref 6.4–8.2)
RBC # BLD AUTO: 4.15 X10*6/UL (ref 4–5.2)
SODIUM SERPL-SCNC: 141 MMOL/L (ref 136–145)
TRIGL SERPL-MCNC: 58 MG/DL (ref 0–149)
TSH SERPL-ACNC: 1.76 MIU/L (ref 0.44–3.98)
VLDL: 12 MG/DL (ref 0–40)
WBC # BLD AUTO: 8.6 X10*3/UL (ref 4.4–11.3)

## 2024-07-09 PROCEDURE — 82306 VITAMIN D 25 HYDROXY: CPT

## 2024-07-09 PROCEDURE — 99215 OFFICE O/P EST HI 40 MIN: CPT | Performed by: INTERNAL MEDICINE

## 2024-07-09 PROCEDURE — 1159F MED LIST DOCD IN RCRD: CPT | Performed by: INTERNAL MEDICINE

## 2024-07-09 PROCEDURE — 80061 LIPID PANEL: CPT

## 2024-07-09 PROCEDURE — 36415 COLL VENOUS BLD VENIPUNCTURE: CPT

## 2024-07-09 PROCEDURE — 3078F DIAST BP <80 MM HG: CPT | Performed by: INTERNAL MEDICINE

## 2024-07-09 PROCEDURE — 1036F TOBACCO NON-USER: CPT | Performed by: INTERNAL MEDICINE

## 2024-07-09 PROCEDURE — 84443 ASSAY THYROID STIM HORMONE: CPT

## 2024-07-09 PROCEDURE — 3077F SYST BP >= 140 MM HG: CPT | Performed by: INTERNAL MEDICINE

## 2024-07-09 PROCEDURE — 80053 COMPREHEN METABOLIC PANEL: CPT

## 2024-07-09 PROCEDURE — G2211 COMPLEX E/M VISIT ADD ON: HCPCS | Performed by: INTERNAL MEDICINE

## 2024-07-09 PROCEDURE — 85027 COMPLETE CBC AUTOMATED: CPT

## 2024-07-09 RX ORDER — NEBIVOLOL 10 MG/1
10 TABLET ORAL DAILY
Qty: 90 TABLET | Refills: 3 | Status: SHIPPED | OUTPATIENT
Start: 2024-07-09 | End: 2025-07-09

## 2024-07-09 RX ORDER — HYDROCHLOROTHIAZIDE 12.5 MG/1
12.5 TABLET ORAL DAILY
Qty: 90 TABLET | Refills: 3 | Status: SHIPPED | OUTPATIENT
Start: 2024-07-09 | End: 2025-07-04

## 2024-07-09 NOTE — PROGRESS NOTES
Patient ID: Caridad Rodriguez is a 83 y.o. female who presents for the following    Chronic disease follow up     Medicare wellness 12/29/23   Assessment/Plan   Constipation: stable, taking lactulose daily      COPD - stable on trelgey   Responds well to the following  Prednisone 40mg x 5 day 20mg for 5 days  Azithromycin  Medications given upfront to prevent hospitalizations, patient is educated how to administer medications for her COPD exacerbation symptoms.   Mucinex 1200mg daily     spirometry  FEV1 49%,      she has a nebulizer at home.   Continue Trelegy.       diastolic chf :  on 20mg lasix daily  Currently trace edema      dry weight is 210lbs, currently patient is 220 lbs (she has been gaining weight)     - recommend lasix 20mg daily now (increased to 40mg x 2 days 4/3/2024)   -restart hydrochlorothiazide 12.5mg daily (7/9/24)    Htn:   Continue on  amlodipine 10mg daily   Start bystolic  5 mg (4/9/2024) --> 10mg daily (7/9/24)  Lasix 40mg x 2 days     Follow up next week     morbid obesity: stable, recommend diet and weight loss     a-fib: stable, rate controlled on metoprolol    continue on xeralto reduced to 15mg daily given GFR)     GFR is up and down given lasix dosing. Precaution with xarelto advised .     Anemia: stable BACK UP 13 from hgb 9-10       Needs RSV vaccine and covid booster up date   HPI   female with hemorrhoids, htn, hld, bilateral tka, hx sbo jan 2021 & Feb 2022, a-fib (on xarelto), diastolic CHF wants to follow up for      bronchitis vs COPD: patient  doing well. Has as needed inhaler , prednisone is on hand as needed for exacerbations. She has a nebulizer      diastolic chf/ bilateral lower extremity edema: she has currently taken 20mg lasix daily. patient is on lisinopril 40mg daily, amlodipine 10mg daily.  Her current weight is 220lbs.       A-fib: patient is now on metoprolol 25mg bid and xarelto 20mg daily. echo september 2021 shows normal ef with elevated RVSP. she has mild bruising  "on her right forearm but no bleeding from xeralto      NO BLEEDING Colonoscopy: Took cologuard at home which was positive, Coloscopy done by Dr. Peña, states she doesn't need another colonoscopy now for the rest of her life     Mammogram: 6-7 year ago, no family hx of breast cancer     patient has great granddaughters      family medical history: both mom and dad  DM and stroke, brother  lung ca      surgery: open incisional hernia repair May 17, 2022 Dr Orona      HOSPITALIZATIONS   [facility] 2022 - 2022 for small bowel obstruction that resolved within the day. months later she had her hernia repaired in May 2022.       [facility]  to 2022 for severe diarrhea. abd/pelvic ct shows SBO. labs show uti. patient improved quicker then expected. she is sent with cipro going home and she is to follow up with general surgery for hernia repair     Visit Vitals  /75 (BP Location: Left arm, Patient Position: Sitting, BP Cuff Size: Adult)   Pulse 71   Ht 1.626 m (5' 4\")   Wt 99.8 kg (220 lb)   SpO2 94%   BMI 37.76 kg/m²   Smoking Status Former   BSA 2.12 m²        PHYSICAL EXAM    General appearance: Alert and in no acute distress. speech is clear and coherent  HEENT: Sclera and conjunctiva white, EOMI,  No head trauma    Respiratory : No respiratory distress, normal respiratory rhythm and effort. Clear bilateral breath sounds. No wheezing or rhonchi.   Cardiovascular: heart rate regular, S1, S2. no murmurs. no Lower extremity edema  Skin inspection: Normal skin color and pigmentation, normal skin turgor and no visible rash, induration, or cellulitis  MSK: 5/5 strength upper and lower extremities without gait abnormalities. no loss of muscle mass   Neuro: 2-12 CN grossly intact.  no slurred speech. no lateralizing deficit  Psychiatric Orientation: Oriented to person, place, and time. no depression, homicidal or suicidal thoughts, normal affect       REVIEW OF SYSTEMS        " Constitutional: not feeling tired and no fever, chills or sweats. Denies weight loss     Cardiovascular: no exertional chest pain, no palpitations, no lower extremity edema   Lungs: Denies shortness of breath, exertional dyspnea, wheezing  Gastrointestinal: no change in bowel habits, no diarrhea, no nausea,    Musculoskeletal: no myalgias, no muscle weakness    Psychiatric: no depression and no anxiety.          Allergies   Allergen Reactions    Adhesive Tape-Silicones Unknown    Latex Unknown    Metronidazole Unknown         Current Medications          Current Outpatient Medications   Medication Sig Dispense Refill    albuterol (Ventolin HFA) 90 mcg/actuation inhaler Inhale 2 puffs every 4 (four) hours if needed.        amLODIPine (Norvasc) 10 mg tablet Take 1 tablet (10 mg) by mouth 1 (one) time each day.        aspirin 81 mg capsule Take 1 tablet by mouth 1 (one) time each day.        fluticasone furoate-vilanteroL (Breo Ellipta) 100-25 mcg/dose inhaler Inhale 1 puff in the morning. After inhalation rinse mouth with water and spit. Use same time each day, no more than once in 24 hours.        furosemide (Lasix) 20 mg tablet Take 1 tablet (20 mg) by mouth 1 (one) time each day.        hydroCHLOROthiazide (HYDRODiuril) 25 mg tablet Take 1 tablet (25 mg) by mouth 1 (one) time each day.        lactulose 20 gram/30 mL oral solution Take 15 mL (10 g) by mouth every other day.        latanoprost (Xalatan) 0.005 % ophthalmic solution Latanoprost 0.005 % Ophthalmic Solution   Refills: 0         Start : 22-Apr-2019   Active  2.5 ML Bottle        lisinopril 40 mg tablet Take 1 tablet (40 mg) by mouth 1 (one) time each day.        magnesium hydroxide (Milk of Magnesia) 400 mg/5 mL suspension Milk of Magnesia 400 MG/5ML Oral Suspension   Refills: 0         Start : 19-Jan-2021   Active        metoprolol tartrate (Lopressor) 25 mg tablet Take 1 tablet (25 mg) by mouth in the morning and at bedtime.        omeprazole OTC  (PriLOSEC OTC) 20 mg EC tablet Take 1 tablet (20 mg) by mouth 1 (one) time each day. Do not crush, chew, or split. (While on steroids)        polyethylene glycol (Miralax) 17 gram/dose powder MiraLax 17 GM/SCOOP Oral Powder   Refills: 0         Start : 30-Jul-2020   Active        predniSONE (Deltasone) 20 mg tablet Take 1 tablet (20 mg) by mouth 1 (one) time each day. Take 2 tablets for 5 days, then 1 tablet for 5 days for COPD exacerbation        rivaroxaban (Xarelto) 20 mg tablet Take 1 tablet (20 mg) by mouth 1 (one) time each day.        rosuvastatin (Crestor) 20 mg tablet Take 1 tablet (20 mg) by mouth 1 (one) time each day.        tiZANidine (Zanaflex) 4 mg tablet at bedtime. 1 tab po at night before sleep        triamcinolone (Kenalog) 0.1 % cream 3 times a day. APPLY SPARINGLY TO AFFECTED AREA(S) 3 TIMES A DAY          No current facility-administered medications for this visit.                  Objective           No visits with results within 4 Month(s) from this visit.   Latest known visit with results is:   Legacy Encounter on 10/18/2022   Component Date Value Ref Range Status    WBC, Urine 10/18/2022 928 (A)  0 - 5 /HPF Final    WBC Clumps, Urine 10/18/2022 OCC  /HPF Final    RBC, Urine 10/18/2022 73 (A)  0 - 5 /HPF Final    Squamous Epithelial Cells, Urine 10/18/2022 3  /HPF Final    Bacteria, Urine 10/18/2022 2+ (A)  /HPF Final    Mucus, Urine 10/18/2022 1+  /LPF Final    Color, Urine 10/18/2022 YELLOW  STRAW,YELLOW Final    Appearance, Urine 10/18/2022 HAZY  CLEAR Final    Specific Gravity, Urine 10/18/2022 1.011  1.005 - 1.035 Final    pH, Urine 10/18/2022 5.0  5.0 - 8.0 Final    Protein, Urine 10/18/2022 30 (1+) (A)  NEGATIVE mg/dL Final    Glucose, Urine 10/18/2022 NEGATIVE  NEGATIVE mg/dL Final    Blood, Urine 10/18/2022 LARGE (3+) (A)  NEGATIVE Final    Ketones, Urine 10/18/2022 NEGATIVE  NEGATIVE mg/dL Final    Bilirubin, Urine 10/18/2022 NEGATIVE  NEGATIVE Final    Urobilinogen, Urine  10/18/2022 <2.0  0.0 - 1.9 mg/dL Final    Nitrite, Urine 10/18/2022 NEGATIVE  NEGATIVE Final    Leukocyte Esterase, Urine 10/18/2022 LARGE (3+) (A)  NEGATIVE Final         Radiology: Reviewed imaging in powerchart.  No results found.     Family History          Family History   Problem Relation Name Age of Onset    Hypertension Mother        Stroke Mother        Hypertension Father        Stroke Father        Lung cancer Brother             Social History   Social History            Socioeconomic History    Marital status:        Spouse name: None    Number of children: None    Years of education: None    Highest education level: None   Occupational History    None   Tobacco Use    Smoking status: Former       Types: Cigarettes    Smokeless tobacco: Former   Vaping Use    Vaping status: None   Substance and Sexual Activity    Alcohol use: None    Drug use: None    Sexual activity: None   Other Topics Concern    None   Social History Narrative    None      Social Determinants of Health      Financial Resource Strain: Not on file   Food Insecurity: Not on file   Transportation Needs: Not on file   Physical Activity: Not on file   Stress: Not on file   Social Connections: Not on file   Intimate Partner Violence: Not on file   Housing Stability: Not on file         Medical History        Past Medical History:   Diagnosis Date    Diverticulitis of intestine, part unspecified, without perforation or abscess without bleeding 05/30/2019     Dvtrcli of intest, part unsp, w/o perf or abscess w/o bleed    Other conditions influencing health status 05/30/2019     Cystocele    Personal history of other diseases of the circulatory system       History of hypertension    Personal history of other diseases of the musculoskeletal system and connective tissue       History of osteoarthritis    Personal history of other diseases of the nervous system and sense organs       History of carpal tunnel syndrome    Personal history  of urinary (tract) infections 10/10/2017     History of urinary tract infection         Surgical History         Past Surgical History:   Procedure Laterality Date    APPENDECTOMY   05/30/2019     Appendectomy    CHOLECYSTECTOMY   05/30/2019     Cholecystectomy    COLON SURGERY   05/30/2019     Colon Surgery    HERNIA REPAIR   05/30/2019     Hernia Repair    LUNG SURGERY   05/30/2019     Lung Surgery    MR HEAD ANGIO WO IV CONTRAST   4/23/2016     MR HEAD ANGIO WO IV CONTRAST 4/23/2016 [facility] INPATIENT LEGACY    OTHER SURGICAL HISTORY   05/30/2019     Excision of basal cell carcinoma    OTHER SURGICAL HISTORY   05/30/2019     Hemorrhoidectomy    TONSILLECTOMY   05/30/2019     Tonsillectomy    TOTAL HIP ARTHROPLASTY   05/30/2019     Hip Replacement    TOTAL KNEE ARTHROPLASTY   05/30/2019     Knee Replacement

## 2024-07-12 ENCOUNTER — TELEPHONE (OUTPATIENT)
Dept: PRIMARY CARE | Facility: CLINIC | Age: 84
End: 2024-07-12
Payer: MEDICARE

## 2024-07-12 DIAGNOSIS — I48.91 ATRIAL FIBRILLATION, UNSPECIFIED TYPE (MULTI): ICD-10-CM

## 2024-07-24 DIAGNOSIS — I48.91 ATRIAL FIBRILLATION, UNSPECIFIED TYPE (MULTI): ICD-10-CM

## 2024-08-12 ENCOUNTER — TELEPHONE (OUTPATIENT)
Dept: PRIMARY CARE | Facility: CLINIC | Age: 84
End: 2024-08-12
Payer: MEDICARE

## 2024-08-13 ENCOUNTER — TELEPHONE (OUTPATIENT)
Dept: PRIMARY CARE | Facility: CLINIC | Age: 84
End: 2024-08-13
Payer: MEDICARE

## 2024-08-13 DIAGNOSIS — K59.00 CONSTIPATION, UNSPECIFIED CONSTIPATION TYPE: ICD-10-CM

## 2024-08-13 RX ORDER — LACTULOSE 10 G/15ML
10 SOLUTION ORAL; RECTAL 2 TIMES DAILY
Qty: 237 ML | Refills: 1 | Status: SHIPPED | OUTPATIENT
Start: 2024-08-13

## 2024-08-14 ENCOUNTER — TELEPHONE (OUTPATIENT)
Dept: PRIMARY CARE | Facility: CLINIC | Age: 84
End: 2024-08-14
Payer: MEDICARE

## 2024-09-09 ENCOUNTER — APPOINTMENT (OUTPATIENT)
Dept: PRIMARY CARE | Facility: CLINIC | Age: 84
End: 2024-09-09
Payer: MEDICARE

## 2024-09-09 DIAGNOSIS — T14.8XXA HEMATOMA: Primary | ICD-10-CM

## 2024-09-09 PROCEDURE — 1159F MED LIST DOCD IN RCRD: CPT | Performed by: INTERNAL MEDICINE

## 2024-09-09 PROCEDURE — 99441 PR PHYS/QHP TELEPHONE EVALUATION 5-10 MIN: CPT | Performed by: INTERNAL MEDICINE

## 2024-09-09 NOTE — PROGRESS NOTES
Patient ID: Caridad Rodriguez is a 83 y.o. female who presents for the following  Telephone appointment for fall and hematoma ecchymosis to buttock     Medicare wellness 12/29/23   Assessment/Plan   Multiple falls likely ecchymosis/hematoma on her buttock region:   Defers home health care  Warm compresses     CKD stage 3: GFR 50s, continue hydration. Will continue to monitor through blood work.     Other medical issues, see below  Constipation: stable, taking lactulose daily      COPD - stable on trelgey   Responds well to the following  Prednisone 40mg x 5 day 20mg for 5 days  Azithromycin  Medications given upfront to prevent hospitalizations, patient is educated how to administer medications for her COPD exacerbation symptoms.   Mucinex 1200mg daily     spirometry  FEV1 49%,      she has a nebulizer at home.   Continue Trelegy.       diastolic chf :  on 20mg lasix daily  Currently trace edema      dry weight is 210lbs, currently patient is 220 lbs (she has been gaining weight)     - recommend lasix 20mg daily now (increased to 40mg x 2 days 4/3/2024)   -restart hydrochlorothiazide 12.5mg daily (7/9/24)    Htn:   Continue on  amlodipine 10mg daily   Start bystolic  5 mg (4/9/2024) --> 10mg daily (7/9/24)  Lasix 40mg x 2 days     Follow up next week     morbid obesity: stable, recommend diet and weight loss     a-fib: stable, rate controlled on metoprolol    continue on xeralto reduced to 15mg daily given GFR)     GFR is up and down given lasix dosing. Precaution with xarelto advised .     Anemia: stable BACK UP 13 from hgb 9-10       Needs RSV vaccine and covid booster up date   HPI   female with hemorrhoids, htn, hld, bilateral tka, hx sbo jan 2021 & Feb 2022, a-fib (on xarelto), diastolic CHF wants to follow up for      Multiple falls: patient on August 29, 2024 bent over to get to a cabinet and she backed up and fell and hit her head going backwards to the shower wall. She went to [facility] and she did not break  anything. Left side of buttock, she has a hard and protrude. Her other buttock region is purple.   Defers home health care.   Jennifer daughter on the phone    Other medical issues, see below   bronchitis vs COPD: patient  doing well. Has as needed inhaler , prednisone is on hand as needed for exacerbations. She has a nebulizer      diastolic chf/ bilateral lower extremity edema: she has currently taken 20mg lasix daily. patient is on lisinopril 40mg daily, amlodipine 10mg daily.  Her current weight is 220lbs.       A-fib: patient is now on metoprolol 25mg bid and xarelto 20mg daily. echo 2021 shows normal ef with elevated RVSP. she has mild bruising on her right forearm but no bleeding from xeralto      NO BLEEDING Colonoscopy: Took cologuard at home which was positive, Coloscopy done by Dr. Peña, states she doesn't need another colonoscopy now for the rest of her life     Mammogram: 6-7 year ago, no family hx of breast cancer     patient has great granddaughters      family medical history: both mom and dad  DM and stroke, brother  lung ca      surgery: open incisional hernia repair May 17, 2022 Dr Orona      HOSPITALIZATIONS   [facility] 2022 - 2022 for small bowel obstruction that resolved within the day. months later she had her hernia repaired in May 2022.       [facility]  to 2022 for severe diarrhea. abd/pelvic ct shows SBO. labs show uti. patient improved quicker then expected. she is sent with cipro going home and she is to follow up with general surgery for hernia repair     Visit Vitals  Smoking Status Former        PHYSICAL EXAM       REVIEW OF SYSTEMS               Allergies   Allergen Reactions    Adhesive Tape-Silicones Unknown    Latex Unknown    Metronidazole Unknown         Current Medications          Current Outpatient Medications   Medication Sig Dispense Refill    albuterol (Ventolin HFA) 90 mcg/actuation inhaler Inhale 2 puffs every 4 (four) hours if  needed.        amLODIPine (Norvasc) 10 mg tablet Take 1 tablet (10 mg) by mouth 1 (one) time each day.        aspirin 81 mg capsule Take 1 tablet by mouth 1 (one) time each day.        fluticasone furoate-vilanteroL (Breo Ellipta) 100-25 mcg/dose inhaler Inhale 1 puff in the morning. After inhalation rinse mouth with water and spit. Use same time each day, no more than once in 24 hours.        furosemide (Lasix) 20 mg tablet Take 1 tablet (20 mg) by mouth 1 (one) time each day.        hydroCHLOROthiazide (HYDRODiuril) 25 mg tablet Take 1 tablet (25 mg) by mouth 1 (one) time each day.        lactulose 20 gram/30 mL oral solution Take 15 mL (10 g) by mouth every other day.        latanoprost (Xalatan) 0.005 % ophthalmic solution Latanoprost 0.005 % Ophthalmic Solution   Refills: 0         Start : 22-Apr-2019   Active  2.5 ML Bottle        lisinopril 40 mg tablet Take 1 tablet (40 mg) by mouth 1 (one) time each day.        magnesium hydroxide (Milk of Magnesia) 400 mg/5 mL suspension Milk of Magnesia 400 MG/5ML Oral Suspension   Refills: 0         Start : 19-Jan-2021   Active        metoprolol tartrate (Lopressor) 25 mg tablet Take 1 tablet (25 mg) by mouth in the morning and at bedtime.        omeprazole OTC (PriLOSEC OTC) 20 mg EC tablet Take 1 tablet (20 mg) by mouth 1 (one) time each day. Do not crush, chew, or split. (While on steroids)        polyethylene glycol (Miralax) 17 gram/dose powder MiraLax 17 GM/SCOOP Oral Powder   Refills: 0         Start : 30-Jul-2020   Active        predniSONE (Deltasone) 20 mg tablet Take 1 tablet (20 mg) by mouth 1 (one) time each day. Take 2 tablets for 5 days, then 1 tablet for 5 days for COPD exacerbation        rivaroxaban (Xarelto) 20 mg tablet Take 1 tablet (20 mg) by mouth 1 (one) time each day.        rosuvastatin (Crestor) 20 mg tablet Take 1 tablet (20 mg) by mouth 1 (one) time each day.        tiZANidine (Zanaflex) 4 mg tablet at bedtime. 1 tab po at night before sleep         triamcinolone (Kenalog) 0.1 % cream 3 times a day. APPLY SPARINGLY TO AFFECTED AREA(S) 3 TIMES A DAY          No current facility-administered medications for this visit.                  Objective           No visits with results within 4 Month(s) from this visit.   Latest known visit with results is:   Legacy Encounter on 10/18/2022   Component Date Value Ref Range Status    WBC, Urine 10/18/2022 928 (A)  0 - 5 /HPF Final    WBC Clumps, Urine 10/18/2022 OCC  /HPF Final    RBC, Urine 10/18/2022 73 (A)  0 - 5 /HPF Final    Squamous Epithelial Cells, Urine 10/18/2022 3  /HPF Final    Bacteria, Urine 10/18/2022 2+ (A)  /HPF Final    Mucus, Urine 10/18/2022 1+  /LPF Final    Color, Urine 10/18/2022 YELLOW  STRAW,YELLOW Final    Appearance, Urine 10/18/2022 HAZY  CLEAR Final    Specific Gravity, Urine 10/18/2022 1.011  1.005 - 1.035 Final    pH, Urine 10/18/2022 5.0  5.0 - 8.0 Final    Protein, Urine 10/18/2022 30 (1+) (A)  NEGATIVE mg/dL Final    Glucose, Urine 10/18/2022 NEGATIVE  NEGATIVE mg/dL Final    Blood, Urine 10/18/2022 LARGE (3+) (A)  NEGATIVE Final    Ketones, Urine 10/18/2022 NEGATIVE  NEGATIVE mg/dL Final    Bilirubin, Urine 10/18/2022 NEGATIVE  NEGATIVE Final    Urobilinogen, Urine 10/18/2022 <2.0  0.0 - 1.9 mg/dL Final    Nitrite, Urine 10/18/2022 NEGATIVE  NEGATIVE Final    Leukocyte Esterase, Urine 10/18/2022 LARGE (3+) (A)  NEGATIVE Final         Radiology: Reviewed imaging in powerchart.  No results found.     Family History          Family History   Problem Relation Name Age of Onset    Hypertension Mother        Stroke Mother        Hypertension Father        Stroke Father        Lung cancer Brother             Social History   Social History            Socioeconomic History    Marital status:        Spouse name: None    Number of children: None    Years of education: None    Highest education level: None   Occupational History    None   Tobacco Use    Smoking status: Former        Types: Cigarettes    Smokeless tobacco: Former   Vaping Use    Vaping status: None   Substance and Sexual Activity    Alcohol use: None    Drug use: None    Sexual activity: None   Other Topics Concern    None   Social History Narrative    None      Social Determinants of Health      Financial Resource Strain: Not on file   Food Insecurity: Not on file   Transportation Needs: Not on file   Physical Activity: Not on file   Stress: Not on file   Social Connections: Not on file   Intimate Partner Violence: Not on file   Housing Stability: Not on file         Medical History        Past Medical History:   Diagnosis Date    Diverticulitis of intestine, part unspecified, without perforation or abscess without bleeding 05/30/2019     Dvtrcli of intest, part unsp, w/o perf or abscess w/o bleed    Other conditions influencing health status 05/30/2019     Cystocele    Personal history of other diseases of the circulatory system       History of hypertension    Personal history of other diseases of the musculoskeletal system and connective tissue       History of osteoarthritis    Personal history of other diseases of the nervous system and sense organs       History of carpal tunnel syndrome    Personal history of urinary (tract) infections 10/10/2017     History of urinary tract infection         Surgical History         Past Surgical History:   Procedure Laterality Date    APPENDECTOMY   05/30/2019     Appendectomy    CHOLECYSTECTOMY   05/30/2019     Cholecystectomy    COLON SURGERY   05/30/2019     Colon Surgery    HERNIA REPAIR   05/30/2019     Hernia Repair    LUNG SURGERY   05/30/2019     Lung Surgery    MR HEAD ANGIO WO IV CONTRAST   4/23/2016     MR HEAD ANGIO WO IV CONTRAST 4/23/2016 [facility] [place] INPATIENT LEGACY    OTHER SURGICAL HISTORY   05/30/2019     Excision of basal cell carcinoma    OTHER SURGICAL HISTORY   05/30/2019     Hemorrhoidectomy    TONSILLECTOMY   05/30/2019     Tonsillectomy    TOTAL HIP ARTHROPLASTY    05/30/2019     Hip Replacement    TOTAL KNEE ARTHROPLASTY   05/30/2019     Knee Replacement

## 2024-09-27 PROBLEM — N18.31 CHRONIC KIDNEY DISEASE, STAGE 3A (MULTI): Status: ACTIVE | Noted: 2024-09-27

## 2024-10-07 ENCOUNTER — TELEPHONE (OUTPATIENT)
Dept: PRIMARY CARE | Facility: CLINIC | Age: 84
End: 2024-10-07
Payer: MEDICARE

## 2024-10-07 DIAGNOSIS — E78.5 HYPERLIPIDEMIA, UNSPECIFIED HYPERLIPIDEMIA TYPE: ICD-10-CM

## 2024-10-07 RX ORDER — ROSUVASTATIN CALCIUM 20 MG/1
20 TABLET, COATED ORAL DAILY
Qty: 90 TABLET | Refills: 3 | Status: SHIPPED | OUTPATIENT
Start: 2024-10-07

## 2024-10-09 ENCOUNTER — APPOINTMENT (OUTPATIENT)
Dept: PRIMARY CARE | Facility: CLINIC | Age: 84
End: 2024-10-09
Payer: MEDICARE

## 2024-10-09 ENCOUNTER — LAB (OUTPATIENT)
Dept: LAB | Facility: LAB | Age: 84
End: 2024-10-09
Payer: MEDICARE

## 2024-10-09 VITALS
OXYGEN SATURATION: 97 % | HEART RATE: 71 BPM | BODY MASS INDEX: 36.54 KG/M2 | HEIGHT: 64 IN | DIASTOLIC BLOOD PRESSURE: 65 MMHG | WEIGHT: 214 LBS | SYSTOLIC BLOOD PRESSURE: 125 MMHG

## 2024-10-09 DIAGNOSIS — N18.31 CHRONIC KIDNEY DISEASE, STAGE 3A (MULTI): Primary | ICD-10-CM

## 2024-10-09 DIAGNOSIS — I10 PRIMARY HYPERTENSION: ICD-10-CM

## 2024-10-09 DIAGNOSIS — J44.9 CHRONIC OBSTRUCTIVE PULMONARY DISEASE, UNSPECIFIED COPD TYPE (MULTI): ICD-10-CM

## 2024-10-09 DIAGNOSIS — I48.91 ATRIAL FIBRILLATION, UNSPECIFIED TYPE (MULTI): ICD-10-CM

## 2024-10-09 PROCEDURE — 1159F MED LIST DOCD IN RCRD: CPT | Performed by: INTERNAL MEDICINE

## 2024-10-09 PROCEDURE — 3074F SYST BP LT 130 MM HG: CPT | Performed by: INTERNAL MEDICINE

## 2024-10-09 PROCEDURE — 3078F DIAST BP <80 MM HG: CPT | Performed by: INTERNAL MEDICINE

## 2024-10-09 PROCEDURE — G2211 COMPLEX E/M VISIT ADD ON: HCPCS | Performed by: INTERNAL MEDICINE

## 2024-10-09 PROCEDURE — 80048 BASIC METABOLIC PNL TOTAL CA: CPT

## 2024-10-09 PROCEDURE — 36415 COLL VENOUS BLD VENIPUNCTURE: CPT

## 2024-10-09 PROCEDURE — 1123F ACP DISCUSS/DSCN MKR DOCD: CPT | Performed by: INTERNAL MEDICINE

## 2024-10-09 PROCEDURE — 85027 COMPLETE CBC AUTOMATED: CPT

## 2024-10-09 PROCEDURE — 99214 OFFICE O/P EST MOD 30 MIN: CPT | Performed by: INTERNAL MEDICINE

## 2024-10-09 PROCEDURE — 1036F TOBACCO NON-USER: CPT | Performed by: INTERNAL MEDICINE

## 2024-10-09 NOTE — PROGRESS NOTES
Patient ID: Caridad Rodriguez is a 83 y.o. female who presents for the following     Chronic disease follow up    Medicare wellness 12/29/23   Assessment/Plan   Multiple falls likely ecchymosis/hematoma on her buttock region:   Defers home health care  Warm compresses     CKD stage 3: GFR 50s, continue hydration. Will continue to monitor through blood work.     Other medical issues, see below  Constipation: stable, taking lactulose daily      COPD - stable on trelgey   Responds well to the following  Prednisone 40mg x 5 day 20mg for 5 days  Azithromycin  Medications given upfront to prevent hospitalizations, patient is educated how to administer medications for her COPD exacerbation symptoms.   Mucinex 1200mg daily     spirometry  FEV1 49%,      she has a nebulizer at home.   Continue Trelegy.       diastolic chf :  on 20mg lasix daily  Currently trace edema      dry weight is 210lbs, currently patient is 214 lbs (she has been gaining weight)     - recommend lasix 20mg daily    -restart hydrochlorothiazide 12.5mg daily (7/9/24)    Htn:   Continue on  amlodipine 10mg daily   Start bystolic  5 mg (4/9/2024) --> 10mg daily (7/9/24)    morbid obesity: stable, recommend diet and weight loss     a-fib: stable, rate controlled on metoprolol    continue on xeralto reduced to 15mg daily given GFR)     GFR is up and down given lasix dosing. Precaution with xarelto advised .     Anemia: stable BACK UP 13 from hgb 9-10       Needs RSV vaccine and covid booster up date     HPI   female with hemorrhoids, htn, hld, bilateral tka, hx sbo jan 2021 & Feb 2022, a-fib (on xarelto), diastolic CHF wants to follow up for      bronchitis vs COPD: patient  doing well. Has as needed inhaler , prednisone is on hand as needed for exacerbations. She has a nebulizer      diastolic chf/ bilateral lower extremity edema: she has currently taken 20mg lasix daily. patient is on lisinopril 40mg daily, amlodipine 10mg daily.  Her current weight is 220lbs.  "      A-fib: patient is now on metoprolol 25mg bid and xarelto 20mg daily. echo 2021 shows normal ef with elevated RVSP. she has mild bruising on her right forearm but no bleeding from xeralto      NO BLEEDING Colonoscopy: Took cologuard at home which was positive, Coloscopy done by Dr. Peña, states she doesn't need another colonoscopy now for the rest of her life     Mammogram: 6-7 year ago, no family hx of breast cancer     patient has great granddaughters      family medical history: both mom and dad  DM and stroke, brother  lung ca      surgery: open incisional hernia repair May 17, 2022 Dr Orona      HOSPITALIZATIONS   [facility] 2022 - 2022 for small bowel obstruction that resolved within the day. months later she had her hernia repaired in May 2022.       [facility]  to 2022 for severe diarrhea. abd/pelvic ct shows SBO. labs show uti. patient improved quicker then expected. she is sent with cipro going home and she is to follow up with general surgery for hernia repair     Visit Vitals  /74 (BP Location: Right arm, Patient Position: Sitting, BP Cuff Size: Adult)   Pulse 71   Ht 1.626 m (5' 4\")   Wt 97.1 kg (214 lb)   SpO2 97%   BMI 36.73 kg/m²   Smoking Status Former   BSA 2.09 m²        PHYSICAL EXAM     General appearance: Alert and in no acute distress. speech is clear and coherent  HEENT: Sclera and conjunctiva white, EOMI,     Respiratory : No respiratory distress, normal respiratory rhythm and effort. Clear bilateral breath sounds. No wheezing or rhonchi.   Cardiovascular: heart rate regular, S1, S2. no murmurs. no Lower extremity edema  Skin inspection: Normal skin color and pigmentation, normal skin turgor and no visible rash, induration, or cellulitis  MSK: 5/5 strength upper and lower extremities without gait abnormalities. no loss of muscle mass   Neuro: 2-12 CN grossly intact.  no slurred speech. no lateralizing deficit  Psychiatric Orientation: " Oriented to person, place, and time. no depression, homicidal or suicidal thoughts, normal affect     REVIEW OF SYSTEMS      Constitutional: not feeling tired and no fever, chills or sweats. Denies weight loss     Cardiovascular: no exertional chest pain, no palpitations, no lower extremity edema    Lungs: Denies shortness of breath, exertional dyspnea, wheezing  Gastrointestinal: no change in bowel habits, no diarrhea, no nausea, no vomiting    Musculoskeletal: no myalgias, no muscle weakness and no limb swelling.   Skin: no rashes, no change in skin color and pigmentation and no skin lumps.   Neurological: no headaches, no seizures, no numbness, no lateralizing deficits and no fainting.   Psychiatric: no depression and no anxiety.   Urine: denies polyuria, hematuria, dysuria             Allergies   Allergen Reactions    Adhesive Tape-Silicones Unknown    Latex Unknown    Metronidazole Unknown         Current Medications          Current Outpatient Medications   Medication Sig Dispense Refill    albuterol (Ventolin HFA) 90 mcg/actuation inhaler Inhale 2 puffs every 4 (four) hours if needed.        amLODIPine (Norvasc) 10 mg tablet Take 1 tablet (10 mg) by mouth 1 (one) time each day.        aspirin 81 mg capsule Take 1 tablet by mouth 1 (one) time each day.        fluticasone furoate-vilanteroL (Breo Ellipta) 100-25 mcg/dose inhaler Inhale 1 puff in the morning. After inhalation rinse mouth with water and spit. Use same time each day, no more than once in 24 hours.        furosemide (Lasix) 20 mg tablet Take 1 tablet (20 mg) by mouth 1 (one) time each day.        hydroCHLOROthiazide (HYDRODiuril) 25 mg tablet Take 1 tablet (25 mg) by mouth 1 (one) time each day.        lactulose 20 gram/30 mL oral solution Take 15 mL (10 g) by mouth every other day.        latanoprost (Xalatan) 0.005 % ophthalmic solution Latanoprost 0.005 % Ophthalmic Solution   Refills: 0         Start : 22-Apr-2019   Active  2.5 ML Bottle         lisinopril 40 mg tablet Take 1 tablet (40 mg) by mouth 1 (one) time each day.        magnesium hydroxide (Milk of Magnesia) 400 mg/5 mL suspension Milk of Magnesia 400 MG/5ML Oral Suspension   Refills: 0         Start : 19-Jan-2021   Active        metoprolol tartrate (Lopressor) 25 mg tablet Take 1 tablet (25 mg) by mouth in the morning and at bedtime.        omeprazole OTC (PriLOSEC OTC) 20 mg EC tablet Take 1 tablet (20 mg) by mouth 1 (one) time each day. Do not crush, chew, or split. (While on steroids)        polyethylene glycol (Miralax) 17 gram/dose powder MiraLax 17 GM/SCOOP Oral Powder   Refills: 0         Start : 30-Jul-2020   Active        predniSONE (Deltasone) 20 mg tablet Take 1 tablet (20 mg) by mouth 1 (one) time each day. Take 2 tablets for 5 days, then 1 tablet for 5 days for COPD exacerbation        rivaroxaban (Xarelto) 20 mg tablet Take 1 tablet (20 mg) by mouth 1 (one) time each day.        rosuvastatin (Crestor) 20 mg tablet Take 1 tablet (20 mg) by mouth 1 (one) time each day.        tiZANidine (Zanaflex) 4 mg tablet at bedtime. 1 tab po at night before sleep        triamcinolone (Kenalog) 0.1 % cream 3 times a day. APPLY SPARINGLY TO AFFECTED AREA(S) 3 TIMES A DAY          No current facility-administered medications for this visit.                  Objective           No visits with results within 4 Month(s) from this visit.   Latest known visit with results is:   Legacy Encounter on 10/18/2022   Component Date Value Ref Range Status    WBC, Urine 10/18/2022 928 (A)  0 - 5 /HPF Final    WBC Clumps, Urine 10/18/2022 OCC  /HPF Final    RBC, Urine 10/18/2022 73 (A)  0 - 5 /HPF Final    Squamous Epithelial Cells, Urine 10/18/2022 3  /HPF Final    Bacteria, Urine 10/18/2022 2+ (A)  /HPF Final    Mucus, Urine 10/18/2022 1+  /LPF Final    Color, Urine 10/18/2022 YELLOW  STRAW,YELLOW Final    Appearance, Urine 10/18/2022 HAZY  CLEAR Final    Specific Gravity, Urine 10/18/2022 1.011  1.005 - 1.035  Final    pH, Urine 10/18/2022 5.0  5.0 - 8.0 Final    Protein, Urine 10/18/2022 30 (1+) (A)  NEGATIVE mg/dL Final    Glucose, Urine 10/18/2022 NEGATIVE  NEGATIVE mg/dL Final    Blood, Urine 10/18/2022 LARGE (3+) (A)  NEGATIVE Final    Ketones, Urine 10/18/2022 NEGATIVE  NEGATIVE mg/dL Final    Bilirubin, Urine 10/18/2022 NEGATIVE  NEGATIVE Final    Urobilinogen, Urine 10/18/2022 <2.0  0.0 - 1.9 mg/dL Final    Nitrite, Urine 10/18/2022 NEGATIVE  NEGATIVE Final    Leukocyte Esterase, Urine 10/18/2022 LARGE (3+) (A)  NEGATIVE Final         Radiology: Reviewed imaging in powerchart.  No results found.     Family History          Family History   Problem Relation Name Age of Onset    Hypertension Mother        Stroke Mother        Hypertension Father        Stroke Father        Lung cancer Brother             Social History   Social History            Socioeconomic History    Marital status:        Spouse name: None    Number of children: None    Years of education: None    Highest education level: None   Occupational History    None   Tobacco Use    Smoking status: Former       Types: Cigarettes    Smokeless tobacco: Former   Vaping Use    Vaping status: None   Substance and Sexual Activity    Alcohol use: None    Drug use: None    Sexual activity: None   Other Topics Concern    None   Social History Narrative    None      Social Determinants of Health      Financial Resource Strain: Not on file   Food Insecurity: Not on file   Transportation Needs: Not on file   Physical Activity: Not on file   Stress: Not on file   Social Connections: Not on file   Intimate Partner Violence: Not on file   Housing Stability: Not on file         Medical History        Past Medical History:   Diagnosis Date    Diverticulitis of intestine, part unspecified, without perforation or abscess without bleeding 05/30/2019     Dvtrcli of intest, part unsp, w/o perf or abscess w/o bleed    Other conditions influencing health status  05/30/2019     Cystocele    Personal history of other diseases of the circulatory system       History of hypertension    Personal history of other diseases of the musculoskeletal system and connective tissue       History of osteoarthritis    Personal history of other diseases of the nervous system and sense organs       History of carpal tunnel syndrome    Personal history of urinary (tract) infections 10/10/2017     History of urinary tract infection         Surgical History         Past Surgical History:   Procedure Laterality Date    APPENDECTOMY   05/30/2019     Appendectomy    CHOLECYSTECTOMY   05/30/2019     Cholecystectomy    COLON SURGERY   05/30/2019     Colon Surgery    HERNIA REPAIR   05/30/2019     Hernia Repair    LUNG SURGERY   05/30/2019     Lung Surgery    MR HEAD ANGIO WO IV CONTRAST   4/23/2016     MR HEAD ANGIO WO IV CONTRAST 4/23/2016 [facility] INPATIENT LEGACY    OTHER SURGICAL HISTORY   05/30/2019     Excision of basal cell carcinoma    OTHER SURGICAL HISTORY   05/30/2019     Hemorrhoidectomy    TONSILLECTOMY   05/30/2019     Tonsillectomy    TOTAL HIP ARTHROPLASTY   05/30/2019     Hip Replacement    TOTAL KNEE ARTHROPLASTY   05/30/2019     Knee Replacement

## 2024-10-10 LAB
ANION GAP SERPL CALC-SCNC: 14 MMOL/L (ref 10–20)
BUN SERPL-MCNC: 21 MG/DL (ref 6–23)
CALCIUM SERPL-MCNC: 9.9 MG/DL (ref 8.6–10.6)
CHLORIDE SERPL-SCNC: 99 MMOL/L (ref 98–107)
CO2 SERPL-SCNC: 30 MMOL/L (ref 21–32)
CREAT SERPL-MCNC: 0.95 MG/DL (ref 0.5–1.05)
EGFRCR SERPLBLD CKD-EPI 2021: 60 ML/MIN/1.73M*2
ERYTHROCYTE [DISTWIDTH] IN BLOOD BY AUTOMATED COUNT: 13.7 % (ref 11.5–14.5)
GLUCOSE SERPL-MCNC: 91 MG/DL (ref 74–99)
HCT VFR BLD AUTO: 39.9 % (ref 36–46)
HGB BLD-MCNC: 12.1 G/DL (ref 12–16)
MCH RBC QN AUTO: 31 PG (ref 26–34)
MCHC RBC AUTO-ENTMCNC: 30.3 G/DL (ref 32–36)
MCV RBC AUTO: 102 FL (ref 80–100)
NRBC BLD-RTO: 0 /100 WBCS (ref 0–0)
PLATELET # BLD AUTO: 300 X10*3/UL (ref 150–450)
POTASSIUM SERPL-SCNC: 4.1 MMOL/L (ref 3.5–5.3)
RBC # BLD AUTO: 3.9 X10*6/UL (ref 4–5.2)
SODIUM SERPL-SCNC: 139 MMOL/L (ref 136–145)
WBC # BLD AUTO: 7.7 X10*3/UL (ref 4.4–11.3)

## 2024-12-02 ENCOUNTER — TELEPHONE (OUTPATIENT)
Dept: PRIMARY CARE | Facility: CLINIC | Age: 84
End: 2024-12-02
Payer: MEDICARE

## 2024-12-02 DIAGNOSIS — K59.00 CONSTIPATION, UNSPECIFIED CONSTIPATION TYPE: ICD-10-CM

## 2024-12-03 RX ORDER — LACTULOSE 10 G/15ML
10 SOLUTION ORAL; RECTAL 2 TIMES DAILY
Qty: 237 ML | Refills: 1 | Status: SHIPPED | OUTPATIENT
Start: 2024-12-03

## 2024-12-17 ENCOUNTER — APPOINTMENT (OUTPATIENT)
Dept: PRIMARY CARE | Facility: CLINIC | Age: 84
End: 2024-12-17
Payer: MEDICARE

## 2024-12-17 VITALS
BODY MASS INDEX: 35.85 KG/M2 | HEIGHT: 64 IN | SYSTOLIC BLOOD PRESSURE: 130 MMHG | DIASTOLIC BLOOD PRESSURE: 70 MMHG | HEART RATE: 57 BPM | WEIGHT: 210 LBS | OXYGEN SATURATION: 96 %

## 2024-12-17 DIAGNOSIS — I48.91 ATRIAL FIBRILLATION, UNSPECIFIED TYPE (MULTI): ICD-10-CM

## 2024-12-17 DIAGNOSIS — J44.9 CHRONIC OBSTRUCTIVE PULMONARY DISEASE, UNSPECIFIED COPD TYPE (MULTI): ICD-10-CM

## 2024-12-17 DIAGNOSIS — I50.30 DIASTOLIC CONGESTIVE HEART FAILURE, UNSPECIFIED HF CHRONICITY: ICD-10-CM

## 2024-12-17 DIAGNOSIS — I10 PRIMARY HYPERTENSION: Primary | ICD-10-CM

## 2024-12-17 PROCEDURE — 1159F MED LIST DOCD IN RCRD: CPT | Performed by: INTERNAL MEDICINE

## 2024-12-17 PROCEDURE — 1036F TOBACCO NON-USER: CPT | Performed by: INTERNAL MEDICINE

## 2024-12-17 PROCEDURE — 1123F ACP DISCUSS/DSCN MKR DOCD: CPT | Performed by: INTERNAL MEDICINE

## 2024-12-17 PROCEDURE — 1170F FXNL STATUS ASSESSED: CPT | Performed by: INTERNAL MEDICINE

## 2024-12-17 PROCEDURE — 3078F DIAST BP <80 MM HG: CPT | Performed by: INTERNAL MEDICINE

## 2024-12-17 PROCEDURE — 99214 OFFICE O/P EST MOD 30 MIN: CPT | Performed by: INTERNAL MEDICINE

## 2024-12-17 PROCEDURE — 1160F RVW MEDS BY RX/DR IN RCRD: CPT | Performed by: INTERNAL MEDICINE

## 2024-12-17 PROCEDURE — 3075F SYST BP GE 130 - 139MM HG: CPT | Performed by: INTERNAL MEDICINE

## 2024-12-17 PROCEDURE — G0439 PPPS, SUBSEQ VISIT: HCPCS | Performed by: INTERNAL MEDICINE

## 2024-12-17 ASSESSMENT — ACTIVITIES OF DAILY LIVING (ADL)
MANAGING_FINANCES: INDEPENDENT
BATHING: INDEPENDENT
TAKING_MEDICATION: INDEPENDENT
DOING_HOUSEWORK: INDEPENDENT
GROCERY_SHOPPING: INDEPENDENT
DRESSING: INDEPENDENT

## 2024-12-17 ASSESSMENT — PATIENT HEALTH QUESTIONNAIRE - PHQ9
1. LITTLE INTEREST OR PLEASURE IN DOING THINGS: NOT AT ALL
SUM OF ALL RESPONSES TO PHQ9 QUESTIONS 1 AND 2: 0
2. FEELING DOWN, DEPRESSED OR HOPELESS: NOT AT ALL

## 2024-12-17 NOTE — PROGRESS NOTES
Patient ID: Caridad Rodriguez is a 83 y.o. female who presents for the following     Chronic disease follow up    Medicare wellness 12/17/24   Assessment/Plan   CKD stage 3: GFR 50s, continue hydration. Will continue to monitor through blood work.     Constipation: stable, taking lactulose daily      COPD - stable on trelgey   Responds well to the following  Prednisone 40mg x 5 day 20mg for 5 days  Azithromycin  Medications given upfront to prevent hospitalizations, patient is educated how to administer medications for her COPD exacerbation symptoms.   Mucinex 1200mg daily     spirometry  FEV1 49%,      she has a nebulizer at home.   Continue Trelegy.     Needs RSV and flu shot needed still she is going to [facility]       diastolic chf :  on 20mg lasix daily  Currently trace edema      dry weight is 210lbs, currently patient is 214 lbs (she has been gaining weight)     - recommend lasix 20mg daily    -restart hydrochlorothiazide 12.5mg daily (7/9/24)    Htn:   Continue on  amlodipine 10mg daily   Start bystolic  5 mg (4/9/2024) --> 10mg daily (7/9/24)    morbid obesity: stable, recommend diet and weight loss     a-fib: stable, rate controlled on metoprolol    continue on xeralto reduced to 15mg daily given GFR)     GFR is up and down given lasix dosing. Precaution with xarelto advised .     Anemia: stable BACK UP 13 from hgb 9-10       Needs RSV vaccine and covid booster up date     HPI   female with hemorrhoids, htn, hld, bilateral tka, hx sbo jan 2021 & Feb 2022, a-fib (on xarelto), diastolic CHF wants to follow up for      bronchitis vs COPD: patient  doing well. Has as needed inhaler , prednisone is on hand as needed for exacerbations. She has a nebulizer      diastolic chf/ bilateral lower extremity edema: she has currently taken 20mg lasix daily. patient is on lisinopril 40mg daily, amlodipine 10mg daily.  Her current weight is 210lbs.       A-fib: patient is now on metoprolol 25mg bid and xarelto 20mg daily. echo  "2021 shows normal ef with elevated RVSP. she has mild bruising on her right forearm but no bleeding from xeralto      NO BLEEDING Colonoscopy: Took cologuard at home which was positive, Coloscopy done by Dr. Peña, states she doesn't need another colonoscopy now for the rest of her life     Mammogram: 6-7 year ago, no family hx of breast cancer     patient has great granddaughters      family medical history: both mom and dad  DM and stroke, brother  lung ca      surgery: open incisional hernia repair May 17, 2022 Dr Orona      HOSPITALIZATIONS   [facility] 2022 - 2022 for small bowel obstruction that resolved within the day. months later she had her hernia repaired in May 2022.       [facility]  to 2022 for severe diarrhea. abd/pelvic ct shows SBO. labs show uti. patient improved quicker then expected. [facility] and she is to follow up with general surgery for hernia repair     Visit Vitals  /70 (BP Location: Right arm, Patient Position: Sitting, BP Cuff Size: Adult)   Pulse 57   Ht 1.626 m (5' 4\")   Wt 95.3 kg (210 lb)   SpO2 96%   BMI 36.05 kg/m²   Smoking Status Former   BSA 2.07 m²        PHYSICAL EXAM     General appearance: Alert and in no acute distress. speech is clear and coherent  HEENT: Sclera and conjunctiva white, EOMI,     Respiratory : No respiratory distress, normal respiratory rhythm and effort. Clear bilateral breath sounds. No wheezing or rhonchi.   Cardiovascular: heart rate regular, S1, S2. no murmurs. no Lower extremity edema   MSK: 5/5 strength upper and lower extremities without gait abnormalities. no loss of muscle mass   Neuro: 2-12 CN grossly intact.  no slurred speech. no lateralizing deficit  Psychiatric Orientation: Oriented to person, place, and time. no depression, homicidal or suicidal thoughts, normal affect     REVIEW OF SYSTEMS      Constitutional: not feeling tired and no fever, chills or sweats. Denies " weight loss     Cardiovascular: no exertional chest pain, no palpitations, no lower extremity edema    Lungs: Denies shortness of breath, exertional dyspnea, wheezing  Gastrointestinal: no change in bowel habits, no diarrhea, no nausea,    Musculoskeletal: no myalgias, no muscle weakness     Neurological:  no numbness, no lateralizing deficits and no fainting.   Psychiatric: no depression and no anxiety.   Urine: denies polyuria, hematuria, dysuria             Allergies   Allergen Reactions    Adhesive Tape-Silicones Unknown    Latex Unknown    Metronidazole Unknown         Current Medications          Current Outpatient Medications   Medication Sig Dispense Refill    albuterol (Ventolin HFA) 90 mcg/actuation inhaler Inhale 2 puffs every 4 (four) hours if needed.        amLODIPine (Norvasc) 10 mg tablet Take 1 tablet (10 mg) by mouth 1 (one) time each day.        aspirin 81 mg capsule Take 1 tablet by mouth 1 (one) time each day.        fluticasone furoate-vilanteroL (Breo Ellipta) 100-25 mcg/dose inhaler Inhale 1 puff in the morning. After inhalation rinse mouth with water and spit. Use same time each day, no more than once in 24 hours.        furosemide (Lasix) 20 mg tablet Take 1 tablet (20 mg) by mouth 1 (one) time each day.        hydroCHLOROthiazide (HYDRODiuril) 25 mg tablet Take 1 tablet (25 mg) by mouth 1 (one) time each day.        lactulose 20 gram/30 mL oral solution Take 15 mL (10 g) by mouth every other day.        latanoprost (Xalatan) 0.005 % ophthalmic solution Latanoprost 0.005 % Ophthalmic Solution   Refills: 0         Start : 22-Apr-2019   Active  2.5 ML Bottle        lisinopril 40 mg tablet Take 1 tablet (40 mg) by mouth 1 (one) time each day.        magnesium hydroxide (Milk of Magnesia) 400 mg/5 mL suspension Milk of Magnesia 400 MG/5ML Oral Suspension   Refills: 0         Start : 19-Jan-2021   Active        metoprolol tartrate (Lopressor) 25 mg tablet Take 1 tablet (25 mg) by mouth in the  morning and at bedtime.        omeprazole OTC (PriLOSEC OTC) 20 mg EC tablet Take 1 tablet (20 mg) by mouth 1 (one) time each day. Do not crush, chew, or split. (While on steroids)        polyethylene glycol (Miralax) 17 gram/dose powder MiraLax 17 GM/SCOOP Oral Powder   Refills: 0         Start : 30-Jul-2020   Active        predniSONE (Deltasone) 20 mg tablet Take 1 tablet (20 mg) by mouth 1 (one) time each day. Take 2 tablets for 5 days, then 1 tablet for 5 days for COPD exacerbation        rivaroxaban (Xarelto) 20 mg tablet Take 1 tablet (20 mg) by mouth 1 (one) time each day.        rosuvastatin (Crestor) 20 mg tablet Take 1 tablet (20 mg) by mouth 1 (one) time each day.        tiZANidine (Zanaflex) 4 mg tablet at bedtime. 1 tab po at night before sleep        triamcinolone (Kenalog) 0.1 % cream 3 times a day. APPLY SPARINGLY TO AFFECTED AREA(S) 3 TIMES A DAY          No current facility-administered medications for this visit.                  Objective           No visits with results within 4 Month(s) from this visit.   Latest known visit with results is:   Legacy Encounter on 10/18/2022   Component Date Value Ref Range Status    WBC, Urine 10/18/2022 928 (A)  0 - 5 /HPF Final    WBC Clumps, Urine 10/18/2022 OCC  /HPF Final    RBC, Urine 10/18/2022 73 (A)  0 - 5 /HPF Final    Squamous Epithelial Cells, Urine 10/18/2022 3  /HPF Final    Bacteria, Urine 10/18/2022 2+ (A)  /HPF Final    Mucus, Urine 10/18/2022 1+  /LPF Final    Color, Urine 10/18/2022 YELLOW  STRAW,YELLOW Final    Appearance, Urine 10/18/2022 HAZY  CLEAR Final    Specific Gravity, Urine 10/18/2022 1.011  1.005 - 1.035 Final    pH, Urine 10/18/2022 5.0  5.0 - 8.0 Final    Protein, Urine 10/18/2022 30 (1+) (A)  NEGATIVE mg/dL Final    Glucose, Urine 10/18/2022 NEGATIVE  NEGATIVE mg/dL Final    Blood, Urine 10/18/2022 LARGE (3+) (A)  NEGATIVE Final    Ketones, Urine 10/18/2022 NEGATIVE  NEGATIVE mg/dL Final    Bilirubin, Urine 10/18/2022 NEGATIVE   NEGATIVE Final    Urobilinogen, Urine 10/18/2022 <2.0  0.0 - 1.9 mg/dL Final    Nitrite, Urine 10/18/2022 NEGATIVE  NEGATIVE Final    Leukocyte Esterase, Urine 10/18/2022 LARGE (3+) (A)  NEGATIVE Final         Radiology: Reviewed imaging in powerchart.  No results found.     Family History          Family History   Problem Relation Name Age of Onset    Hypertension Mother        Stroke Mother        Hypertension Father        Stroke Father        Lung cancer Brother             Social History   Social History            Socioeconomic History    Marital status:        Spouse name: None    Number of children: None    Years of education: None    Highest education level: None   Occupational History    None   Tobacco Use    Smoking status: Former       Types: Cigarettes    Smokeless tobacco: Former   Vaping Use    Vaping status: None   Substance and Sexual Activity    Alcohol use: None    Drug use: None    Sexual activity: None   Other Topics Concern    None   Social History Narrative    None      Social Determinants of Health      Financial Resource Strain: Not on file   Food Insecurity: Not on file   Transportation Needs: Not on file   Physical Activity: Not on file   Stress: Not on file   Social Connections: Not on file   Intimate Partner Violence: Not on file   Housing Stability: Not on file         Medical History        Past Medical History:   Diagnosis Date    Diverticulitis of intestine, part unspecified, without perforation or abscess without bleeding 05/30/2019     Dvtrcli of intest, part unsp, w/o perf or abscess w/o bleed    Other conditions influencing health status 05/30/2019     Cystocele    Personal history of other diseases of the circulatory system       History of hypertension    Personal history of other diseases of the musculoskeletal system and connective tissue       History of osteoarthritis    Personal history of other diseases of the nervous system and sense organs       History of carpal  tunnel syndrome    Personal history of urinary (tract) infections 10/10/2017     History of urinary tract infection         Surgical History         Past Surgical History:   Procedure Laterality Date    APPENDECTOMY   05/30/2019     Appendectomy    CHOLECYSTECTOMY   05/30/2019     Cholecystectomy    COLON SURGERY   05/30/2019     Colon Surgery    HERNIA REPAIR   05/30/2019     Hernia Repair    LUNG SURGERY   05/30/2019     Lung Surgery    MR HEAD ANGIO WO IV CONTRAST   4/23/2016     MR HEAD ANGIO WO IV CONTRAST 4/23/2016 [facility] INPATIENT LEGACY    OTHER SURGICAL HISTORY   05/30/2019     Excision of basal cell carcinoma    OTHER SURGICAL HISTORY   05/30/2019     Hemorrhoidectomy    TONSILLECTOMY   05/30/2019     Tonsillectomy    TOTAL HIP ARTHROPLASTY   05/30/2019     Hip Replacement    TOTAL KNEE ARTHROPLASTY   05/30/2019     Knee Replacement

## 2024-12-30 ENCOUNTER — TELEPHONE (OUTPATIENT)
Dept: PRIMARY CARE | Facility: CLINIC | Age: 84
End: 2024-12-30
Payer: MEDICARE

## 2024-12-30 ENCOUNTER — TELEMEDICINE (OUTPATIENT)
Dept: PRIMARY CARE | Facility: CLINIC | Age: 84
End: 2024-12-30
Payer: MEDICARE

## 2024-12-30 DIAGNOSIS — J20.9 ACUTE BRONCHITIS, UNSPECIFIED ORGANISM: ICD-10-CM

## 2024-12-30 DIAGNOSIS — I10 PRIMARY HYPERTENSION: ICD-10-CM

## 2024-12-30 DIAGNOSIS — J44.0 ACUTE BRONCHITIS WITH COPD (MULTI): Primary | ICD-10-CM

## 2024-12-30 DIAGNOSIS — J20.9 ACUTE BRONCHITIS WITH COPD (MULTI): Primary | ICD-10-CM

## 2024-12-30 PROCEDURE — 1123F ACP DISCUSS/DSCN MKR DOCD: CPT | Performed by: INTERNAL MEDICINE

## 2024-12-30 PROCEDURE — 99441 PR PHYS/QHP TELEPHONE EVALUATION 5-10 MIN: CPT | Performed by: INTERNAL MEDICINE

## 2024-12-30 RX ORDER — IPRATROPIUM BROMIDE AND ALBUTEROL SULFATE 2.5; .5 MG/3ML; MG/3ML
3 SOLUTION RESPIRATORY (INHALATION) 4 TIMES DAILY PRN
Qty: 360 ML | Refills: 11 | Status: SHIPPED | OUTPATIENT
Start: 2024-12-30 | End: 2025-12-30

## 2024-12-30 RX ORDER — PREDNISONE 20 MG/1
TABLET ORAL
Qty: 15 TABLET | Refills: 0 | Status: SHIPPED | OUTPATIENT
Start: 2024-12-30

## 2024-12-30 RX ORDER — LEVOFLOXACIN 500 MG/1
500 TABLET, FILM COATED ORAL DAILY
Qty: 10 TABLET | Refills: 0 | Status: SHIPPED | OUTPATIENT
Start: 2024-12-30 | End: 2025-01-09

## 2024-12-30 RX ORDER — LISINOPRIL 40 MG/1
40 TABLET ORAL DAILY
Qty: 90 TABLET | Refills: 3 | Status: SHIPPED | OUTPATIENT
Start: 2024-12-30

## 2024-12-30 NOTE — TELEPHONE ENCOUNTER
Pt believes she has bronchitis. Has only one prednisone left and has been using nebulizer. She also has COPD and has been coughing a lot. Would like to know if she should be taking a medication for this. Please advice

## 2024-12-30 NOTE — PROGRESS NOTES
Patient ID: Caridad Rodriguez is a 83 y.o. female who presents for the following      Telephone      A telephone visit (audio only) between the patient (at the originating site) and the provider (at the distant site) was utilized to provide this telehealth service.   Verbal consent was requested and obtained from Caridad Rodriguez on this date, 12/30/24 for a telehealth visit.       Medicare wellness 12/17/24   Assessment/Plan    COPD - stable on trelgey   Responds well to the following  Prednisone 40mg x 5 day 20mg for 5 days  Azithromycin  Medications given upfront to prevent hospitalizations, patient is educated how to administer medications for her COPD exacerbation symptoms.   Mucinex 1200mg daily     spirometry  FEV1 49%,      she has a nebulizer at home.   Continue Trelegy.     Needs RSV and flu shot needed still [facility]     CKD stage 3: GFR 50s, continue hydration. Will continue to monitor through blood work.     Constipation: stable, taking lactulose daily           diastolic chf :  on 20mg lasix daily  Currently trace edema      dry weight is 210lbs, currently patient is 214 lbs (she has been gaining weight)     - recommend lasix 20mg daily      -restart hydrochlorothiazide 12.5mg daily (7/9/24)    Htn:   Continue on  amlodipine 10mg daily   Start bystolic  5 mg (4/9/2024) --> 10mg daily (7/9/24)    morbid obesity: stable, recommend diet and weight loss     a-fib: stable, rate controlled on metoprolol    continue on xeralto reduced to 15mg daily given GFR)     GFR is up and down given lasix dosing. Precaution with xarelto advised .     Anemia: stable BACK UP 13 from hgb 9-10       Needs RSV vaccine and covid booster up date     HPI   female with hemorrhoids, htn, hld, bilateral tka, hx sbo jan 2021 & Feb 2022, a-fib (on xarelto), diastolic CHF wants to follow up for      COPD exacerbation: patient says she started feeling sob last Sunday. Coughing up globs up of mucous.      diastolic chf/  bilateral lower extremity edema: she has currently taken 20mg lasix daily. patient is on lisinopril 40mg daily, amlodipine 10mg daily.  Her current weight is 210lbs.       A-fib: patient is now on metoprolol 25mg bid and xarelto 20mg daily. echo 2021 shows normal ef with elevated RVSP. she has mild bruising on her right forearm but no bleeding from xeralto      NO BLEEDING Colonoscopy: Took cologuard at home which was positive, Coloscopy done by Dr. Peña, states she doesn't need another colonoscopy now for the rest of her life     Mammogram: 6-7 year ago, no family hx of breast cancer     patient has great granddaughters      family medical history: both mom and dad  DM and stroke, brother  lung ca      surgery: open incisional hernia repair May 17, 2022 Dr Orona      HOSPITALIZATIONS   [facility] 2022 - 2022 for small bowel obstruction that resolved within the day. months later she had her hernia repaired in May 2022.       [facility]  to 2022 for severe diarrhea. abd/pelvic ct shows SBO. labs show uti. patient improved quicker then expected. she is sent with cipro going home and she is to follow up with general surgery for hernia repair     Visit Vitals  Smoking Status Former        PHYSICAL EXAM        REVIEW OF SYSTEMS              Allergies   Allergen Reactions    Adhesive Tape-Silicones Unknown    Latex Unknown    Metronidazole Unknown         Current Medications          Current Outpatient Medications   Medication Sig Dispense Refill    albuterol (Ventolin HFA) 90 mcg/actuation inhaler Inhale 2 puffs every 4 (four) hours if needed.        amLODIPine (Norvasc) 10 mg tablet Take 1 tablet (10 mg) by mouth 1 (one) time each day.        aspirin 81 mg capsule Take 1 tablet by mouth 1 (one) time each day.        fluticasone furoate-vilanteroL (Breo Ellipta) 100-25 mcg/dose inhaler Inhale 1 puff in the morning. After inhalation rinse mouth with water and spit. Use same  time each day, no more than once in 24 hours.        furosemide (Lasix) 20 mg tablet Take 1 tablet (20 mg) by mouth 1 (one) time each day.        hydroCHLOROthiazide (HYDRODiuril) 25 mg tablet Take 1 tablet (25 mg) by mouth 1 (one) time each day.        lactulose 20 gram/30 mL oral solution Take 15 mL (10 g) by mouth every other day.        latanoprost (Xalatan) 0.005 % ophthalmic solution Latanoprost 0.005 % Ophthalmic Solution   Refills: 0         Start : 22-Apr-2019   Active  2.5 ML Bottle        lisinopril 40 mg tablet Take 1 tablet (40 mg) by mouth 1 (one) time each day.        magnesium hydroxide (Milk of Magnesia) 400 mg/5 mL suspension Milk of Magnesia 400 MG/5ML Oral Suspension   Refills: 0         Start : 19-Jan-2021   Active        metoprolol tartrate (Lopressor) 25 mg tablet Take 1 tablet (25 mg) by mouth in the morning and at bedtime.        omeprazole OTC (PriLOSEC OTC) 20 mg EC tablet Take 1 tablet (20 mg) by mouth 1 (one) time each day. Do not crush, chew, or split. (While on steroids)        polyethylene glycol (Miralax) 17 gram/dose powder MiraLax 17 GM/SCOOP Oral Powder   Refills: 0         Start : 30-Jul-2020   Active        predniSONE (Deltasone) 20 mg tablet Take 1 tablet (20 mg) by mouth 1 (one) time each day. Take 2 tablets for 5 days, then 1 tablet for 5 days for COPD exacerbation        rivaroxaban (Xarelto) 20 mg tablet Take 1 tablet (20 mg) by mouth 1 (one) time each day.        rosuvastatin (Crestor) 20 mg tablet Take 1 tablet (20 mg) by mouth 1 (one) time each day.        tiZANidine (Zanaflex) 4 mg tablet at bedtime. 1 tab po at night before sleep        triamcinolone (Kenalog) 0.1 % cream 3 times a day. APPLY SPARINGLY TO AFFECTED AREA(S) 3 TIMES A DAY          No current facility-administered medications for this visit.                  Objective           No visits with results within 4 Month(s) from this visit.   Latest known visit with results is:   Legacy Encounter on 10/18/2022    Component Date Value Ref Range Status    WBC, Urine 10/18/2022 928 (A)  0 - 5 /HPF Final    WBC Clumps, Urine 10/18/2022 OCC  /HPF Final    RBC, Urine 10/18/2022 73 (A)  0 - 5 /HPF Final    Squamous Epithelial Cells, Urine 10/18/2022 3  /HPF Final    Bacteria, Urine 10/18/2022 2+ (A)  /HPF Final    Mucus, Urine 10/18/2022 1+  /LPF Final    Color, Urine 10/18/2022 YELLOW  STRAW,YELLOW Final    Appearance, Urine 10/18/2022 HAZY  CLEAR Final    Specific Gravity, Urine 10/18/2022 1.011  1.005 - 1.035 Final    pH, Urine 10/18/2022 5.0  5.0 - 8.0 Final    Protein, Urine 10/18/2022 30 (1+) (A)  NEGATIVE mg/dL Final    Glucose, Urine 10/18/2022 NEGATIVE  NEGATIVE mg/dL Final    Blood, Urine 10/18/2022 LARGE (3+) (A)  NEGATIVE Final    Ketones, Urine 10/18/2022 NEGATIVE  NEGATIVE mg/dL Final    Bilirubin, Urine 10/18/2022 NEGATIVE  NEGATIVE Final    Urobilinogen, Urine 10/18/2022 <2.0  0.0 - 1.9 mg/dL Final    Nitrite, Urine 10/18/2022 NEGATIVE  NEGATIVE Final    Leukocyte Esterase, Urine 10/18/2022 LARGE (3+) (A)  NEGATIVE Final         Radiology: Reviewed imaging in powerchart.  No results found.     Family History          Family History   Problem Relation Name Age of Onset    Hypertension Mother        Stroke Mother        Hypertension Father        Stroke Father        Lung cancer Brother             Social History   Social History            Socioeconomic History    Marital status:        Spouse name: None    Number of children: None    Years of education: None    Highest education level: None   Occupational History    None   Tobacco Use    Smoking status: Former       Types: Cigarettes    Smokeless tobacco: Former   Vaping Use    Vaping status: None   Substance and Sexual Activity    Alcohol use: None    Drug use: None    Sexual activity: None   Other Topics Concern    None   Social History Narrative    None      Social Determinants of Health      Financial Resource Strain: Not on file   Food Insecurity: Not  on file   Transportation Needs: Not on file   Physical Activity: Not on file   Stress: Not on file   Social Connections: Not on file   Intimate Partner Violence: Not on file   Housing Stability: Not on file         Medical History        Past Medical History:   Diagnosis Date    Diverticulitis of intestine, part unspecified, without perforation or abscess without bleeding 05/30/2019     Dvtrcli of intest, part unsp, w/o perf or abscess w/o bleed    Other conditions influencing health status 05/30/2019     Cystocele    Personal history of other diseases of the circulatory system       History of hypertension    Personal history of other diseases of the musculoskeletal system and connective tissue       History of osteoarthritis    Personal history of other diseases of the nervous system and sense organs       History of carpal tunnel syndrome    Personal history of urinary (tract) infections 10/10/2017     History of urinary tract infection         Surgical History         Past Surgical History:   Procedure Laterality Date    APPENDECTOMY   05/30/2019     Appendectomy    CHOLECYSTECTOMY   05/30/2019     Cholecystectomy    COLON SURGERY   05/30/2019     Colon Surgery    HERNIA REPAIR   05/30/2019     Hernia Repair    LUNG SURGERY   05/30/2019     Lung Surgery    MR HEAD ANGIO WO IV CONTRAST   4/23/2016     MR HEAD ANGIO WO IV CONTRAST 4/23/2016 [facility]    OTHER SURGICAL HISTORY   05/30/2019     Excision of basal cell carcinoma    OTHER SURGICAL HISTORY   05/30/2019     Hemorrhoidectomy    TONSILLECTOMY   05/30/2019     Tonsillectomy    TOTAL HIP ARTHROPLASTY   05/30/2019     Hip Replacement    TOTAL KNEE ARTHROPLASTY   05/30/2019     Knee Replacement

## 2024-12-31 ENCOUNTER — APPOINTMENT (OUTPATIENT)
Dept: PHARMACY | Facility: HOSPITAL | Age: 84
End: 2024-12-31
Payer: MEDICARE

## 2024-12-31 DIAGNOSIS — J20.9 ACUTE BRONCHITIS WITH COPD (MULTI): Primary | ICD-10-CM

## 2024-12-31 DIAGNOSIS — J44.0 ACUTE BRONCHITIS WITH COPD (MULTI): Primary | ICD-10-CM

## 2024-12-31 DIAGNOSIS — I48.91 ATRIAL FIBRILLATION, UNSPECIFIED TYPE (MULTI): ICD-10-CM

## 2024-12-31 RX ORDER — FLUTICASONE FUROATE, UMECLIDINIUM BROMIDE AND VILANTEROL TRIFENATATE 100; 62.5; 25 UG/1; UG/1; UG/1
1 POWDER RESPIRATORY (INHALATION) DAILY
Qty: 60 EACH | Refills: 11 | Status: SHIPPED | OUTPATIENT
Start: 2024-12-31

## 2024-12-31 NOTE — PROGRESS NOTES
Clinical Pharmacy Appointment    Patient ID: Caridad Rodriguez is a 84 y.o. female who presents for Atrial Fibrillation.    Pt is here for First appointment.     Referring Provider: Tyrell Marlow DO  PCP: Tyrell Marlow DO   Last visit with PCP: 12/30/24   Next visit with PCP: 3/18/25      Subjective     HPI    Patient is an 84 y.o. female presenting to an initial clinical pharmacy visit for medication cost assistance for Xarelto and Trelegy.     Drug Interactions  No relevant drug interactions were noted.    Medication System Management  Patient's preferred pharmacy: [id number] [place], [place]  Adherence/Organization: 1/28/  Affordability/Accessibility: Cost of Xarelto $143/month due to coverage gap     Patient Assistance Screening (VAF)  Patient verbally reports monthly or yearly income which is less than 400% federal poverty level  Application for program has been submitted for the following medications:   Xarelto 15 mg once daily tablet   Trelegy 100-62.5-25 mcg   Patient aware this process may take up to 2 weeks once income documents have been sent to the team.  If approved, medication must be filled through [facility] pharmacy and may be picked up or mailed to patient.   If approved, medication will be billed through insurance, and patient assistance team will pay the copay. This will result in a $0 copay for the patient.  Counseled patient on mechanism of action, side effects, contraindications, and what to do if the patient misses a dose. All patients questions were answered.       Objective   Allergies   Allergen Reactions    Keflex [Cephalexin] Other    Adhesive Tape-Silicones Itching and Rash    Chlorine Rash    Latex Rash     Social History     Social History Narrative    Not on file      Medication Review  Current Outpatient Medications   Medication Instructions    acetaminophen (TYLENOL ORAL) Take by mouth.    albuterol 90 mcg/actuation inhaler 2 puffs, Every 4 hours PRN     aspirin 81 mg capsule 1 tablet, Daily    fluticasone-umeclidin-vilanter (Trelegy Ellipta) 100-62.5-25 mcg blister with device 1 puff, inhalation, Daily    fluticasone-umeclidin-vilanter (Trelegy Ellipta) 100-62.5-25 mcg blister with device 1 puff, inhalation, Daily    furosemide (LASIX) 20 mg, oral, Daily    hydroCHLOROthiazide (MICROZIDE) 12.5 mg, oral, Daily    ipratropium-albuteroL (Duo-Neb) 0.5-2.5 mg/3 mL nebulizer solution 3 mL, nebulization, 4 times daily PRN    lactulose 10 g, oral, 2 times daily    latanoprost (Xalatan) 0.005 % ophthalmic solution Administer 1 drop into both eyes once daily at bedtime.    levoFLOXacin (LEVAQUIN) 500 mg, oral, Daily    lisinopril 40 mg, oral, Daily    magnesium hydroxide (Milk of Magnesia) 400 mg/5 mL suspension Milk of Magnesia 400 MG/5ML Oral Suspension   Refills: 0        Start : 19-Jan-2021   Active    nebivolol (BYSTOLIC) 10 mg, oral, Daily    omeprazole OTC (PRILOSEC OTC) 20 mg, Daily    polyethylene glycol (Glycolax) 17 gram/dose powder MiraLax 17 GM/SCOOP Oral Powder   Refills: 0        Start : 30-Jul-2020   Active    predniSONE (Deltasone) 20 mg tablet Take 2 tablets for 5 days, then 1 tablet for 5 days for COPD exacerbation    rivaroxaban (XARELTO) 15 mg, oral, Daily    rivaroxaban (XARELTO) 15 mg, oral, Daily, Take with food.    rosuvastatin (CRESTOR) 20 mg, oral, Daily    TIMOLOL MALEATE OPHT 1 drop, ophthalmic (eye), 2 times daily    tiZANidine (ZANAFLEX) 4 mg, oral, Nightly, 1 tab po at night before sleep    triamcinolone (Kenalog) 0.1 % cream Topical, Daily, APPLY SPARINGLY TO AFFECTED AREA(S) 3 TIMES A DAY      Vitals  BP Readings from Last 2 Encounters:   12/17/24 130/70   10/09/24 125/65     BMI Readings from Last 1 Encounters:   12/17/24 36.05 kg/m²      Labs  A1C  Lab Results   Component Value Date    HGBA1C 5.3 03/22/2023     BMP  Lab Results   Component Value Date    CALCIUM 9.9 10/09/2024     10/09/2024    K 4.1 10/09/2024    CO2 30 10/09/2024     "CL 99 10/09/2024    BUN 21 10/09/2024    CREATININE 0.95 10/09/2024    EGFR 60 (L) 10/09/2024     LFTs  Lab Results   Component Value Date    ALT 12 07/09/2024    AST 17 07/09/2024    ALKPHOS 87 07/09/2024    BILITOT 0.5 07/09/2024     FLP  Lab Results   Component Value Date    TRIG 58 07/09/2024    CHOL 114 07/09/2024    LDLF 41 03/22/2023    LDLCALC 40 07/09/2024    HDL 62.5 07/09/2024     Urine Microalbumin  No results found for: \"MICROALBCREA\"  Weight Management  Wt Readings from Last 3 Encounters:   12/17/24 95.3 kg (210 lb)   10/09/24 97.1 kg (214 lb)   07/09/24 99.8 kg (220 lb)      There is no height or weight on file to calculate BMI.     PATIENT EDUCATION/DISCUSSION:  - Counseled patient on MOA, expectations, side effects, duration of therapy, contraindications, administration, and monitoring parameters  - Answered all patient questions and concerns    Assessment/Plan   Problem List Items Addressed This Visit       Acute bronchitis with COPD (Multi) - Primary    Relevant Medications    fluticasone-umeclidin-vilanter (Trelegy Ellipta) 100-62.5-25 mcg blister with device    Atrial fibrillation (Multi)    Relevant Medications    rivaroxaban (Xarelto) 15 mg tablet    Other Relevant Orders    Referral to Clinical Pharmacy   CONTINUE Xarelto 15 mg once daily tablet   Prescription sent to [facility] for mail order   Patient also requested Trelegy 100-62.5-25 mcg inhaling 1 puff daily be added to  PAP application. Therefore, sent prescription to [facility] for mail order   Send [facility] tax documents   CONTINUE monitoring for sign/symptoms of bleeding   CONTINUE making healthy lifestyle choices   Provided patient with [facility] phone number for any additional questions or concerns [phone]    Follow up with Clinical Pharmacy Team 1/28/25 at 8:20    Time spent with pt: Total length of time 30 (minutes) of the encounter and more than 50% was spent counseling the patient.    Continue all meds under the " continuation of care with the referring provider and clinical pharmacy team.    Please reach out to the Clinical Pharmacy Team if there are any further questions.     Verbal consent to manage patient's drug therapy was obtained from patient. They were informed they may decline to participate or withdraw from participation in pharmacy services at any time.    Bren Hobson, PharmD  Clinical Pharmacy Specialist   [phone]

## 2025-01-14 PROCEDURE — RXMED WILLOW AMBULATORY MEDICATION CHARGE

## 2025-01-15 ENCOUNTER — PHARMACY VISIT (OUTPATIENT)
Dept: PHARMACY | Facility: CLINIC | Age: 85
End: 2025-01-15
Payer: COMMERCIAL

## 2025-01-27 ENCOUNTER — TELEPHONE (OUTPATIENT)
Dept: PRIMARY CARE | Facility: CLINIC | Age: 85
End: 2025-01-27
Payer: MEDICARE

## 2025-01-27 DIAGNOSIS — I10 PRIMARY HYPERTENSION: ICD-10-CM

## 2025-01-27 RX ORDER — LISINOPRIL 40 MG/1
40 TABLET ORAL DAILY
Qty: 90 TABLET | Refills: 3 | Status: SHIPPED | OUTPATIENT
Start: 2025-01-27

## 2025-01-28 ENCOUNTER — APPOINTMENT (OUTPATIENT)
Dept: PHARMACY | Facility: HOSPITAL | Age: 85
End: 2025-01-28
Payer: MEDICARE

## 2025-01-28 DIAGNOSIS — I48.91 ATRIAL FIBRILLATION, UNSPECIFIED TYPE (MULTI): ICD-10-CM

## 2025-01-28 NOTE — PROGRESS NOTES
Clinical Pharmacy Appointment    Patient ID: Caridad Rodriguez is a 84 y.o. female who presents for Atrial Fibrillation.    Pt is here for Follow Up appointment.     Referring Provider: Tyrell Marlow DO  PCP: Tyrell Marlow DO   Last visit with PCP: 12/30/24   Next visit with PCP: 3/18/25      Subjective     HPI    Patient is an 84 y.o. female presenting to an initial clinical pharmacy visit for medication cost assistance for Xarelto and Trelegy. At the last visit she was enrolled in [facility] to get these medications for a $0 copay. Denied any issues with the medications.     Drug Interactions  No relevant drug interactions were noted.    Medication System Management  Patient's preferred pharmacy: [id number] [place], [place]  Affordability/Accessibility: Enrolled in [facility] Patient Assistance Screening (VAF)  Patient verbally reports monthly or yearly income which is less than 400% federal poverty level  Application for program has been submitted for the following medications:   Xarelto 15 mg once daily tablet   Trelegy 100-62.5-25 mcg   Patient aware this process may take up to 2 weeks once income documents have been sent to the team.  If approved, medication must be filled through [facility] pharmacy and may be picked up or mailed to patient.   If approved, medication will be billed through insurance, and patient assistance team will pay the copay. This will result in a $0 copay for the patient.  Counseled patient on mechanism of action, side effects, contraindications, and what to do if the patient misses a dose. All patients questions were answered.       Objective   Allergies   Allergen Reactions    Keflex [Cephalexin] Other    Adhesive Tape-Silicones Itching and Rash    Chlorine Rash    Latex Rash     Social History     Social History Narrative    Not on file      Medication Review  Current Outpatient Medications   Medication Instructions    acetaminophen (TYLENOL ORAL) Take by mouth.     albuterol 90 mcg/actuation inhaler 2 puffs, Every 4 hours PRN    aspirin 81 mg capsule 1 tablet, Daily    fluticasone-umeclidin-vilanter (Trelegy Ellipta) 100-62.5-25 mcg blister with device 1 puff, inhalation, Daily    fluticasone-umeclidin-vilanter (Trelegy Ellipta) 100-62.5-25 mcg blister with device 1 puff, inhalation, Daily    furosemide (LASIX) 20 mg, oral, Daily    hydroCHLOROthiazide (MICROZIDE) 12.5 mg, oral, Daily    ipratropium-albuteroL (Duo-Neb) 0.5-2.5 mg/3 mL nebulizer solution 3 mL, nebulization, 4 times daily PRN    lactulose 10 g, oral, 2 times daily    latanoprost (Xalatan) 0.005 % ophthalmic solution Administer 1 drop into both eyes once daily at bedtime.    lisinopril 40 mg, oral, Daily    magnesium hydroxide (Milk of Magnesia) 400 mg/5 mL suspension Milk of Magnesia 400 MG/5ML Oral Suspension   Refills: 0        Start : 19-Jan-2021   Active    nebivolol (BYSTOLIC) 10 mg, oral, Daily    omeprazole OTC (PRILOSEC OTC) 20 mg, Daily    polyethylene glycol (Glycolax) 17 gram/dose powder MiraLax 17 GM/SCOOP Oral Powder   Refills: 0        Start : 30-Jul-2020   Active    predniSONE (Deltasone) 20 mg tablet Take 2 tablets for 5 days, then 1 tablet for 5 days for COPD exacerbation    rivaroxaban (XARELTO) 15 mg, oral, Daily    rosuvastatin (CRESTOR) 20 mg, oral, Daily    TIMOLOL MALEATE OPHT 1 drop, ophthalmic (eye), 2 times daily    tiZANidine (ZANAFLEX) 4 mg, oral, Nightly, 1 tab po at night before sleep    triamcinolone (Kenalog) 0.1 % cream Topical, Daily, APPLY SPARINGLY TO AFFECTED AREA(S) 3 TIMES A DAY    Xarelto 15 mg, oral, Daily, Take with food.      Vitals  BP Readings from Last 2 Encounters:   12/17/24 130/70   10/09/24 125/65     BMI Readings from Last 1 Encounters:   12/17/24 36.05 kg/m²      Labs  A1C  Lab Results   Component Value Date    HGBA1C 5.3 03/22/2023     BMP  Lab Results   Component Value Date    CALCIUM 9.9 10/09/2024     10/09/2024    K 4.1 10/09/2024    CO2 30  "10/09/2024    CL 99 10/09/2024    BUN 21 10/09/2024    CREATININE 0.95 10/09/2024    EGFR 60 (L) 10/09/2024     LFTs  Lab Results   Component Value Date    ALT 12 07/09/2024    AST 17 07/09/2024    ALKPHOS 87 07/09/2024    BILITOT 0.5 07/09/2024     FLP  Lab Results   Component Value Date    TRIG 58 07/09/2024    CHOL 114 07/09/2024    LDLF 41 03/22/2023    LDLCALC 40 07/09/2024    HDL 62.5 07/09/2024     Urine Microalbumin  No results found for: \"MICROALBCREA\"  Weight Management  Wt Readings from Last 3 Encounters:   12/17/24 95.3 kg (210 lb)   10/09/24 97.1 kg (214 lb)   07/09/24 99.8 kg (220 lb)      There is no height or weight on file to calculate BMI.     PATIENT EDUCATION/DISCUSSION:  - Counseled patient on MOA, expectations, side effects, duration of therapy, contraindications, administration, and monitoring parameters  - Answered all patient questions and concerns    Assessment/Plan   Problem List Items Addressed This Visit       Atrial fibrillation (Multi)   Patient denied any issues with enrollment in  PAP. Denied any issues receiving the medications.  CONTINUE Xarelto 15 mg once daily tablet   CONTINUE monitoring for sign/symptoms of bleeding   CONTINUE making healthy lifestyle choices   Provided patient with [facility] phone number for any additional questions or concerns [phone]    Follow up with Clinical Pharmacy Team 1 year for  PAP re-enrollment    Time spent with pt: Total length of time 10 (minutes) of the encounter and more than 50% was spent counseling the patient.    Continue all meds under the continuation of care with the referring provider and clinical pharmacy team.    Please reach out to the Clinical Pharmacy Team if there are any further questions.     Verbal consent to manage patient's drug therapy was obtained from patient. They were informed they may decline to participate or withdraw from participation in pharmacy services at any time.    Bren Hobson, PharmD  Clinical Pharmacy " Specialist   [phone]

## 2025-02-02 PROCEDURE — RXMED WILLOW AMBULATORY MEDICATION CHARGE

## 2025-02-05 ENCOUNTER — PHARMACY VISIT (OUTPATIENT)
Dept: PHARMACY | Facility: CLINIC | Age: 85
End: 2025-02-05
Payer: COMMERCIAL

## 2025-02-28 PROCEDURE — RXMED WILLOW AMBULATORY MEDICATION CHARGE

## 2025-03-04 ENCOUNTER — PHARMACY VISIT (OUTPATIENT)
Dept: PHARMACY | Facility: CLINIC | Age: 85
End: 2025-03-04
Payer: COMMERCIAL

## 2025-03-04 ENCOUNTER — TELEPHONE (OUTPATIENT)
Dept: PRIMARY CARE | Facility: CLINIC | Age: 85
End: 2025-03-04
Payer: MEDICARE

## 2025-03-04 DIAGNOSIS — K59.00 CONSTIPATION, UNSPECIFIED CONSTIPATION TYPE: ICD-10-CM

## 2025-03-05 RX ORDER — LACTULOSE 10 G/15ML
10 SOLUTION ORAL; RECTAL 2 TIMES DAILY
Qty: 237 ML | Refills: 1 | Status: SHIPPED | OUTPATIENT
Start: 2025-03-05

## 2025-03-17 ENCOUNTER — TELEPHONE (OUTPATIENT)
Dept: PRIMARY CARE | Facility: CLINIC | Age: 85
End: 2025-03-17
Payer: MEDICARE

## 2025-03-17 DIAGNOSIS — J44.9 CHRONIC OBSTRUCTIVE PULMONARY DISEASE, UNSPECIFIED COPD TYPE (MULTI): ICD-10-CM

## 2025-03-18 ENCOUNTER — APPOINTMENT (OUTPATIENT)
Dept: PRIMARY CARE | Facility: CLINIC | Age: 85
End: 2025-03-18
Payer: MEDICARE

## 2025-03-18 RX ORDER — FLUTICASONE FUROATE, UMECLIDINIUM BROMIDE AND VILANTEROL TRIFENATATE 100; 62.5; 25 UG/1; UG/1; UG/1
1 POWDER RESPIRATORY (INHALATION) DAILY
Qty: 60 EACH | Refills: 2 | Status: SHIPPED | OUTPATIENT
Start: 2025-03-18

## 2025-03-18 RX ORDER — ALBUTEROL SULFATE 90 UG/1
2 INHALANT RESPIRATORY (INHALATION) EVERY 4 HOURS PRN
Qty: 18 G | Refills: 1 | Status: SHIPPED | OUTPATIENT
Start: 2025-03-18

## 2025-03-26 ENCOUNTER — APPOINTMENT (OUTPATIENT)
Dept: PRIMARY CARE | Facility: CLINIC | Age: 85
End: 2025-03-26
Payer: MEDICARE

## 2025-03-26 VITALS
HEIGHT: 64 IN | DIASTOLIC BLOOD PRESSURE: 72 MMHG | BODY MASS INDEX: 38.41 KG/M2 | OXYGEN SATURATION: 96 % | WEIGHT: 225 LBS | HEART RATE: 59 BPM | SYSTOLIC BLOOD PRESSURE: 150 MMHG

## 2025-03-26 DIAGNOSIS — J44.9 CHRONIC OBSTRUCTIVE PULMONARY DISEASE, UNSPECIFIED COPD TYPE (MULTI): ICD-10-CM

## 2025-03-26 DIAGNOSIS — I10 PRIMARY HYPERTENSION: ICD-10-CM

## 2025-03-26 DIAGNOSIS — I50.30 DIASTOLIC CONGESTIVE HEART FAILURE, UNSPECIFIED HF CHRONICITY: Primary | ICD-10-CM

## 2025-03-26 DIAGNOSIS — J20.9 ACUTE BRONCHITIS, UNSPECIFIED ORGANISM: ICD-10-CM

## 2025-03-26 PROCEDURE — 3077F SYST BP >= 140 MM HG: CPT | Performed by: INTERNAL MEDICINE

## 2025-03-26 PROCEDURE — 99214 OFFICE O/P EST MOD 30 MIN: CPT | Performed by: INTERNAL MEDICINE

## 2025-03-26 PROCEDURE — 3078F DIAST BP <80 MM HG: CPT | Performed by: INTERNAL MEDICINE

## 2025-03-26 PROCEDURE — 1036F TOBACCO NON-USER: CPT | Performed by: INTERNAL MEDICINE

## 2025-03-26 PROCEDURE — 1159F MED LIST DOCD IN RCRD: CPT | Performed by: INTERNAL MEDICINE

## 2025-03-26 PROCEDURE — 1123F ACP DISCUSS/DSCN MKR DOCD: CPT | Performed by: INTERNAL MEDICINE

## 2025-03-26 RX ORDER — PREDNISONE 20 MG/1
TABLET ORAL
Qty: 15 TABLET | Refills: 0 | Status: SHIPPED | OUTPATIENT
Start: 2025-03-26

## 2025-03-26 NOTE — PROGRESS NOTES
Patient ID: Caridad Rodriguez is a 83 y.o. female who presents for the following     Chronic disease follow up    Medicare wellness 12/17/24     Assessment/Plan      diastolic chf : on 20mg lasix daily  Currently trace to +1 edema      dry weight is 210lbs, currently patient is 225 lbs (she has been gaining weight)     - recommend lasix 20mg daily --> 40mg daily x 5 days (3/26/25)   -continue hydrochlorothiazide 12.5mg daily (7/9/24)   -check CMP       CKD stage 3: GFR 50s, continue hydration. Will continue to monitor through blood work.     Constipation: stable, taking lactulose daily      COPD - stable on trelgey   Responds well to the following  Prednisone 40mg x 5 day 20mg for 5 days  Azithromycin  Medications given upfront to prevent hospitalizations, patient is educated how to administer medications for her COPD exacerbation symptoms.   Mucinex 1200mg daily     spirometry  FEV1 49%,          she has a nebulizer at home.   Continue Trelegy.     Needs RSV and flu shot needed [facility]       Htn: lisinopril 40 mg  Start bystolic  5 mg (4/9/2024) --> 10mg daily (7/9/24)    morbid obesity: stable, recommend diet and weight loss     a-fib: stable, rate controlled on metoprolol    continue on xeralto reduced to 15mg daily given GFR)     GFR is up and down given lasix dosing. Precaution with xarelto advised .     Anemia: stable BACK UP 13 from hgb 9-10       Needs RSV vaccine and covid booster up date     HPI   female with hemorrhoids, htn, hld, bilateral tka, hx sbo jan 2021 & Feb 2022, a-fib (on xarelto), diastolic CHF wants to follow up for      bronchitis vs COPD: patient  doing well. Has as needed inhaler , prednisone is on hand as needed for exacerbations. She has a nebulizer      diastolic chf/ bilateral lower extremity edema: she has currently taken 20mg lasix daily. patient is on lisinopril 40mg daily, amlodipine 10mg daily.  Her current weight is 210lbs.       A-fib: patient is now on  "metoprolol 25mg bid and xarelto 20mg daily. echo 2021 shows normal ef with elevated RVSP. she has mild bruising on her right forearm but no bleeding from xeralto      NO BLEEDING Colonoscopy: Took cologuard at home which was positive, Coloscopy done by Dr. Peña, states she doesn't need another colonoscopy now for the rest of her life     Mammogram: 6-7 year ago, no family hx of breast cancer     patient has great granddaughters      family medical history: both mom and dad  DM and stroke, brother  lung ca      surgery: open incisional hernia repair May 17, 2022 Dr Orona      HOSPITALIZATIONS   [facility] 2022 - 2022 for small bowel obstruction that resolved within the day. months later she had her hernia repaired in May 2022.       [facility]  to 2022 for severe diarrhea. abd/pelvic ct shows SBO. labs show uti. patient improved quicker then expected. she is sent with cipro going home and she is to follow up with general surgery for hernia repair     Visit Vitals  Pulse 59   Ht 1.626 m (5' 4\")   Wt 102 kg (225 lb)   SpO2 96%   BMI 38.62 kg/m²   Smoking Status Former   BSA 2.15 m²        PHYSICAL EXAM     General appearance: Alert and in no acute distress. speech is clear and coherent  HEENT: Sclera and conjunctiva white, EOMI,     Respiratory : No respiratory distress, normal respiratory rhythm and effort. Clear bilateral breath sounds. No wheezing or rhonchi.   Cardiovascular: heart rate regular, S1, S2. no murmurs. Trace to +1 Lower extremity edema bilaterally   MSK: 5/5 strength upper and lower extremities without gait abnormalities. no loss of muscle mass   Neuro: 2-12 CN grossly intact.  no slurred speech. no lateralizing deficit  Psychiatric Orientation: Oriented to person, place, and time. no depression, homicidal or suicidal thoughts, normal affect     REVIEW OF SYSTEMS      Constitutional: not feeling tired and no fever, chills or sweats. Denies weight loss   "   Cardiovascular: no exertional chest pain, no palpitations, no lower extremity edema    Lungs: Denies shortness of breath, exertional dyspnea, wheezing  Gastrointestinal: no change in bowel habits, no diarrhea, no nausea,    Musculoskeletal: no myalgias, no muscle weakness     Neurological:  no numbness, no lateralizing deficits and no fainting.   Psychiatric: no depression and no anxiety.   Urine: denies polyuria, hematuria, dysuria             Allergies   Allergen Reactions    Adhesive Tape-Silicones Unknown    Latex Unknown    Metronidazole Unknown         Current Medications          Current Outpatient Medications   Medication Sig Dispense Refill    albuterol (Ventolin HFA) 90 mcg/actuation inhaler Inhale 2 puffs every 4 (four) hours if needed.        amLODIPine (Norvasc) 10 mg tablet Take 1 tablet (10 mg) by mouth 1 (one) time each day.        aspirin 81 mg capsule Take 1 tablet by mouth 1 (one) time each day.        fluticasone furoate-vilanteroL (Breo Ellipta) 100-25 mcg/dose inhaler Inhale 1 puff in the morning. After inhalation rinse mouth with water and spit. Use same time each day, no more than once in 24 hours.        furosemide (Lasix) 20 mg tablet Take 1 tablet (20 mg) by mouth 1 (one) time each day.        hydroCHLOROthiazide (HYDRODiuril) 25 mg tablet Take 1 tablet (25 mg) by mouth 1 (one) time each day.        lactulose 20 gram/30 mL oral solution Take 15 mL (10 g) by mouth every other day.        latanoprost (Xalatan) 0.005 % ophthalmic solution Latanoprost 0.005 % Ophthalmic Solution   Refills: 0         Start : 22-Apr-2019   Active  2.5 ML Bottle        lisinopril 40 mg tablet Take 1 tablet (40 mg) by mouth 1 (one) time each day.        magnesium hydroxide (Milk of Magnesia) 400 mg/5 mL suspension Milk of Magnesia 400 MG/5ML Oral Suspension   Refills: 0         Start : 19-Jan-2021   Active        metoprolol tartrate (Lopressor) 25 mg tablet Take 1 tablet (25 mg) by mouth in the morning and at  bedtime.        omeprazole OTC (PriLOSEC OTC) 20 mg EC tablet Take 1 tablet (20 mg) by mouth 1 (one) time each day. Do not crush, chew, or split. (While on steroids)        polyethylene glycol (Miralax) 17 gram/dose powder MiraLax 17 GM/SCOOP Oral Powder   Refills: 0         Start : 30-Jul-2020   Active        predniSONE (Deltasone) 20 mg tablet Take 1 tablet (20 mg) by mouth 1 (one) time each day. Take 2 tablets for 5 days, then 1 tablet for 5 days for COPD exacerbation        rivaroxaban (Xarelto) 20 mg tablet Take 1 tablet (20 mg) by mouth 1 (one) time each day.        rosuvastatin (Crestor) 20 mg tablet Take 1 tablet (20 mg) by mouth 1 (one) time each day.        tiZANidine (Zanaflex) 4 mg tablet at bedtime. 1 tab po at night before sleep        triamcinolone (Kenalog) 0.1 % cream 3 times a day. APPLY SPARINGLY TO AFFECTED AREA(S) 3 TIMES A DAY          No current facility-administered medications for this visit.                  Objective           No visits with results within 4 Month(s) from this visit.   Latest known visit with results is:   Legacy Encounter on 10/18/2022   Component Date Value Ref Range Status    WBC, Urine 10/18/2022 928 (A)  0 - 5 /HPF Final    WBC Clumps, Urine 10/18/2022 OCC  /HPF Final    RBC, Urine 10/18/2022 73 (A)  0 - 5 /HPF Final    Squamous Epithelial Cells, Urine 10/18/2022 3  /HPF Final    Bacteria, Urine 10/18/2022 2+ (A)  /HPF Final    Mucus, Urine 10/18/2022 1+  /LPF Final    Color, Urine 10/18/2022 YELLOW  STRAW,YELLOW Final    Appearance, Urine 10/18/2022 HAZY  CLEAR Final    Specific Gravity, Urine 10/18/2022 1.011  1.005 - 1.035 Final    pH, Urine 10/18/2022 5.0  5.0 - 8.0 Final    Protein, Urine 10/18/2022 30 (1+) (A)  NEGATIVE mg/dL Final    Glucose, Urine 10/18/2022 NEGATIVE  NEGATIVE mg/dL Final    Blood, Urine 10/18/2022 LARGE (3+) (A)  NEGATIVE Final    Ketones, Urine 10/18/2022 NEGATIVE  NEGATIVE mg/dL Final    Bilirubin, Urine 10/18/2022 NEGATIVE  NEGATIVE Final     Urobilinogen, Urine 10/18/2022 <2.0  0.0 - 1.9 mg/dL Final    Nitrite, Urine 10/18/2022 NEGATIVE  NEGATIVE Final    Leukocyte Esterase, Urine 10/18/2022 LARGE (3+) (A)  NEGATIVE Final         Radiology: Reviewed imaging in powerchart.  No results found.     Family History          Family History   Problem Relation Name Age of Onset    Hypertension Mother        Stroke Mother        Hypertension Father        Stroke Father        Lung cancer Brother             Social History   Social History            Socioeconomic History    Marital status:        Spouse name: None    Number of children: None    Years of education: None    Highest education level: None   Occupational History    None   Tobacco Use    Smoking status: Former       Types: Cigarettes    Smokeless tobacco: Former   Vaping Use    Vaping status: None   Substance and Sexual Activity    Alcohol use: None    Drug use: None    Sexual activity: None   Other Topics Concern    None   Social History Narrative    None      Social Determinants of Health      Financial Resource Strain: Not on file   Food Insecurity: Not on file   Transportation Needs: Not on file   Physical Activity: Not on file   Stress: Not on file   Social Connections: Not on file   Intimate Partner Violence: Not on file   Housing Stability: Not on file         Medical History        Past Medical History:   Diagnosis Date    Diverticulitis of intestine, part unspecified, without perforation or abscess without bleeding 05/30/2019     Dvtrcli of intest, part unsp, w/o perf or abscess w/o bleed    Other conditions influencing health status 05/30/2019     Cystocele    Personal history of other diseases of the circulatory system       History of hypertension    Personal history of other diseases of the musculoskeletal system and connective tissue       History of osteoarthritis    Personal history of other diseases of the nervous system and sense organs       History of carpal tunnel  syndrome    Personal history of urinary (tract) infections 10/10/2017     History of urinary tract infection         Surgical History         Past Surgical History:   Procedure Laterality Date    APPENDECTOMY   05/30/2019     Appendectomy    CHOLECYSTECTOMY   05/30/2019     Cholecystectomy    COLON SURGERY   05/30/2019     Colon Surgery    HERNIA REPAIR   05/30/2019     Hernia Repair    LUNG SURGERY   05/30/2019     Lung Surgery    MR HEAD ANGIO WO IV CONTRAST   4/23/2016     MR HEAD ANGIO WO IV CONTRAST 4/23/2016 [facility]    OTHER SURGICAL HISTORY   05/30/2019     Excision of basal cell carcinoma    OTHER SURGICAL HISTORY   05/30/2019     Hemorrhoidectomy    TONSILLECTOMY   05/30/2019     Tonsillectomy    TOTAL HIP ARTHROPLASTY   05/30/2019     Hip Replacement    TOTAL KNEE ARTHROPLASTY   05/30/2019     Knee Replacement

## 2025-03-31 ENCOUNTER — APPOINTMENT (OUTPATIENT)
Dept: PRIMARY CARE | Facility: CLINIC | Age: 85
End: 2025-03-31
Payer: MEDICARE

## 2025-03-31 ENCOUNTER — TELEPHONE (OUTPATIENT)
Dept: PRIMARY CARE | Facility: CLINIC | Age: 85
End: 2025-03-31

## 2025-03-31 VITALS
WEIGHT: 225 LBS | SYSTOLIC BLOOD PRESSURE: 150 MMHG | HEART RATE: 68 BPM | BODY MASS INDEX: 38.41 KG/M2 | DIASTOLIC BLOOD PRESSURE: 70 MMHG | HEIGHT: 64 IN

## 2025-03-31 DIAGNOSIS — I10 PRIMARY HYPERTENSION: ICD-10-CM

## 2025-03-31 DIAGNOSIS — R60.1 GENERALIZED EDEMA: ICD-10-CM

## 2025-03-31 PROCEDURE — 99213 OFFICE O/P EST LOW 20 MIN: CPT | Performed by: INTERNAL MEDICINE

## 2025-03-31 PROCEDURE — 1123F ACP DISCUSS/DSCN MKR DOCD: CPT | Performed by: INTERNAL MEDICINE

## 2025-03-31 PROCEDURE — 1036F TOBACCO NON-USER: CPT | Performed by: INTERNAL MEDICINE

## 2025-03-31 PROCEDURE — 3077F SYST BP >= 140 MM HG: CPT | Performed by: INTERNAL MEDICINE

## 2025-03-31 PROCEDURE — 1159F MED LIST DOCD IN RCRD: CPT | Performed by: INTERNAL MEDICINE

## 2025-03-31 PROCEDURE — 3078F DIAST BP <80 MM HG: CPT | Performed by: INTERNAL MEDICINE

## 2025-03-31 RX ORDER — HYDROCHLOROTHIAZIDE 25 MG/1
25 TABLET ORAL DAILY
Qty: 90 TABLET | Refills: 3 | Status: SHIPPED | OUTPATIENT
Start: 2025-03-31 | End: 2026-03-26

## 2025-03-31 NOTE — TELEPHONE ENCOUNTER
Please  provide me a date and time in the morning for a 1 month follow up where PT was seen today?

## 2025-03-31 NOTE — PROGRESS NOTES
Patient ID: Caridad Rodriguez is a 83 y.o. female who presents for the following     Chronic disease follow up    Medicare wellness 12/17/24     Assessment/Plan      diastolic chf : on 20mg lasix daily  Currently trace to +1 edema      dry weight is 210lbs, currently patient is 225 lbs (she has been gaining weight)     Patient is apparently not on any lasix at all (even though I though she said she was taking 20mg and she was to take 40mg for 5 days)    Instead she doubled her hydrochlorothiazide to 25mg daily    Lets just continue hydrochlorothiazide 25mg daily    -continue hydrochlorothiazide 12.5mg daily (7/9/24)     Will check bmp at next encounter in 1 month in April 2025      CKD stage 3: GFR 50s, continue hydration. Will continue to monitor through blood work.     Constipation: stable, taking lactulose daily      COPD - stable on trelgey   Responds well to the following  Prednisone 40mg x 5 day 20mg for 5 days  Azithromycin  Medications given upfront to prevent hospitalizations, patient is educated how to administer medications for her COPD exacerbation symptoms.   Mucinex 1200mg daily     spirometry  FEV1 49%,          she has a nebulizer at home.   Continue Trelegy.     Needs RSV and flu shot needed still she is going to [employer]       Htn: lisinopril 40 mg  Start bystolic  5 mg (4/9/2024) --> 10mg daily (7/9/24)    morbid obesity: stable, recommend diet and weight loss     a-fib: stable, rate controlled on metoprolol    continue on xeralto reduced to 15mg daily given GFR)     GFR is up and down given lasix dosing. Precaution with xarelto advised .     Anemia: stable BACK UP 13 from hgb 9-10       Needs RSV vaccine and covid booster up date     HPI   female with hemorrhoids, htn, hld, bilateral tka, hx sbo jan 2021 & Feb 2022, a-fib (on xarelto), diastolic CHF wants to follow up for         diastolic chf/ bilateral lower extremity edema: she has currently taken 20mg lasix daily. patient is on lisinopril 40mg  "daily, amlodipine 10mg daily.  Her current dry weight is 210lbs. Howeve the amount of fluid on her lower extremity is doubtful to be causing her current weight 225lbs. She too thinks that she has been eating too much and will back off on her diet.     Although I intended for her to be on lasix 40mg daily for 5 days, patient accidentally took hydrochlorothiazide 12.5mg x 2 daily instead.      Other medical       A-fib: patient is now on metoprolol 25mg bid and xarelto 20mg daily. echo 2021 shows normal ef with elevated RVSP. she has mild bruising on her right forearm but no bleeding from xeralto      NO BLEEDING Colonoscopy: Took cologuard at home which was positive, Coloscopy done by Dr. Peña, states she doesn't need another colonoscopy now for the rest of her life     Mammogram: 6-7 year ago, no family hx of breast cancer     patient has great granddaughters      family medical history: both mom and dad  DM and stroke, brother  lung ca      surgery: open incisional hernia repair May 17, 2022 Dr Orona      HOSPITALIZATIONS   [facility] 2022 - 2022 for small bowel obstruction that resolved within the day. months later she had her hernia repaired in May 2022.       [facility]  to 2022 for severe diarrhea. abd/pelvic ct shows SBO. labs show uti. patient improved quicker then expected. she is sent with cipro going home and she is to follow up with general surgery for hernia repair     Visit Vitals  /70 (BP Location: Left arm, Patient Position: Sitting, BP Cuff Size: Large adult)   Pulse 68   Ht 1.626 m (5' 4\")   Wt 102 kg (225 lb)   BMI 38.62 kg/m²   Smoking Status Former   BSA 2.15 m²        PHYSICAL EXAM     General appearance: Alert and in no acute distress. speech is clear and coherent  HEENT: Sclera and conjunctiva white, EOMI,     Respiratory : No respiratory distress, normal respiratory rhythm and effort. Clear bilateral breath sounds. No wheezing or rhonchi. "   Cardiovascular: heart rate regular, S1, S2. no murmurs. Trace to +1 Lower extremity edema bilaterally   MSK: 5/5 strength upper and lower extremities without gait abnormalities. no loss of muscle mass      REVIEW OF SYSTEMS      Constitutional: not feeling tired and no fever, chills or sweats. Denies weight loss     Cardiovascular: no exertional chest pain, no palpitations,    Lungs: Denies shortness of breath, exertional dyspnea, wheezing   Musculoskeletal: no myalgias, no muscle weakness     Neurological:  no numbness, no lateralizing deficits and no fainting.   Psychiatric: no depression and no anxiety.             Allergies   Allergen Reactions    Adhesive Tape-Silicones Unknown    Latex Unknown    Metronidazole Unknown         Current Medications          Current Outpatient Medications   Medication Sig Dispense Refill    albuterol (Ventolin HFA) 90 mcg/actuation inhaler Inhale 2 puffs every 4 (four) hours if needed.        amLODIPine (Norvasc) 10 mg tablet Take 1 tablet (10 mg) by mouth 1 (one) time each day.        aspirin 81 mg capsule Take 1 tablet by mouth 1 (one) time each day.        fluticasone furoate-vilanteroL (Breo Ellipta) 100-25 mcg/dose inhaler Inhale 1 puff in the morning. After inhalation rinse mouth with water and spit. Use same time each day, no more than once in 24 hours.        furosemide (Lasix) 20 mg tablet Take 1 tablet (20 mg) by mouth 1 (one) time each day.        hydroCHLOROthiazide (HYDRODiuril) 25 mg tablet Take 1 tablet (25 mg) by mouth 1 (one) time each day.        lactulose 20 gram/30 mL oral solution Take 15 mL (10 g) by mouth every other day.        latanoprost (Xalatan) 0.005 % ophthalmic solution Latanoprost 0.005 % Ophthalmic Solution   Refills: 0         Start : 22-Apr-2019   Active  2.5 ML Bottle        lisinopril 40 mg tablet Take 1 tablet (40 mg) by mouth 1 (one) time each day.        magnesium hydroxide (Milk of Magnesia) 400 mg/5 mL suspension Milk of Magnesia 400  MG/5ML Oral Suspension   Refills: 0         Start : 19-Jan-2021   Active        metoprolol tartrate (Lopressor) 25 mg tablet Take 1 tablet (25 mg) by mouth in the morning and at bedtime.        omeprazole OTC (PriLOSEC OTC) 20 mg EC tablet Take 1 tablet (20 mg) by mouth 1 (one) time each day. Do not crush, chew, or split. (While on steroids)        polyethylene glycol (Miralax) 17 gram/dose powder MiraLax 17 GM/SCOOP Oral Powder   Refills: 0         Start : 30-Jul-2020   Active        predniSONE (Deltasone) 20 mg tablet Take 1 tablet (20 mg) by mouth 1 (one) time each day. Take 2 tablets for 5 days, then 1 tablet for 5 days for COPD exacerbation        rivaroxaban (Xarelto) 20 mg tablet Take 1 tablet (20 mg) by mouth 1 (one) time each day.        rosuvastatin (Crestor) 20 mg tablet Take 1 tablet (20 mg) by mouth 1 (one) time each day.        tiZANidine (Zanaflex) 4 mg tablet at bedtime. 1 tab po at night before sleep        triamcinolone (Kenalog) 0.1 % cream 3 times a day. APPLY SPARINGLY TO AFFECTED AREA(S) 3 TIMES A DAY          No current facility-administered medications for this visit.                  Objective           No visits with results within 4 Month(s) from this visit.   Latest known visit with results is:   Legacy Encounter on 10/18/2022   Component Date Value Ref Range Status    WBC, Urine 10/18/2022 928 (A)  0 - 5 /HPF Final    WBC Clumps, Urine 10/18/2022 OCC  /HPF Final    RBC, Urine 10/18/2022 73 (A)  0 - 5 /HPF Final    Squamous Epithelial Cells, Urine 10/18/2022 3  /HPF Final    Bacteria, Urine 10/18/2022 2+ (A)  /HPF Final    Mucus, Urine 10/18/2022 1+  /LPF Final    Color, Urine 10/18/2022 YELLOW  STRAW,YELLOW Final    Appearance, Urine 10/18/2022 HAZY  CLEAR Final    Specific Gravity, Urine 10/18/2022 1.011  1.005 - 1.035 Final    pH, Urine 10/18/2022 5.0  5.0 - 8.0 Final    Protein, Urine 10/18/2022 30 (1+) (A)  NEGATIVE mg/dL Final    Glucose, Urine 10/18/2022 NEGATIVE  NEGATIVE mg/dL  Final    Blood, Urine 10/18/2022 LARGE (3+) (A)  NEGATIVE Final    Ketones, Urine 10/18/2022 NEGATIVE  NEGATIVE mg/dL Final    Bilirubin, Urine 10/18/2022 NEGATIVE  NEGATIVE Final    Urobilinogen, Urine 10/18/2022 <2.0  0.0 - 1.9 mg/dL Final    Nitrite, Urine 10/18/2022 NEGATIVE  NEGATIVE Final    Leukocyte Esterase, Urine 10/18/2022 LARGE (3+) (A)  NEGATIVE Final         Radiology: Reviewed imaging in powerchart.  No results found.     Family History          Family History   Problem Relation Name Age of Onset    Hypertension Mother        Stroke Mother        Hypertension Father        Stroke Father        Lung cancer Brother             Social History   Social History            Socioeconomic History    Marital status:        Spouse name: None    Number of children: None    Years of education: None    Highest education level: None   Occupational History    None   Tobacco Use    Smoking status: Former       Types: Cigarettes    Smokeless tobacco: Former   Vaping Use    Vaping status: None   Substance and Sexual Activity    Alcohol use: None    Drug use: None    Sexual activity: None   Other Topics Concern    None   Social History Narrative    None      Social Determinants of Health      Financial Resource Strain: Not on file   Food Insecurity: Not on file   Transportation Needs: Not on file   Physical Activity: Not on file   Stress: Not on file   Social Connections: Not on file   Intimate Partner Violence: Not on file   Housing Stability: Not on file         Medical History        Past Medical History:   Diagnosis Date    Diverticulitis of intestine, part unspecified, without perforation or abscess without bleeding 05/30/2019     Dvtrcli of intest, part unsp, w/o perf or abscess w/o bleed    Other conditions influencing health status 05/30/2019     Cystocele    Personal history of other diseases of the circulatory system       History of hypertension    Personal history of other diseases of the  musculoskeletal system and connective tissue       History of osteoarthritis    Personal history of other diseases of the nervous system and sense organs       History of carpal tunnel syndrome    Personal history of urinary (tract) infections 10/10/2017     History of urinary tract infection         Surgical History         Past Surgical History:   Procedure Laterality Date    APPENDECTOMY   05/30/2019     Appendectomy    CHOLECYSTECTOMY   05/30/2019     Cholecystectomy    COLON SURGERY   05/30/2019     Colon Surgery    HERNIA REPAIR   05/30/2019     Hernia Repair    LUNG SURGERY   05/30/2019     Lung Surgery    MR HEAD ANGIO WO IV CONTRAST   4/23/2016     MR HEAD ANGIO WO IV CONTRAST 4/23/2016 [facility] [place] [facility]    OTHER SURGICAL HISTORY   05/30/2019     Excision of basal cell carcinoma    OTHER SURGICAL HISTORY   05/30/2019     Hemorrhoidectomy    TONSILLECTOMY   05/30/2019     Tonsillectomy    TOTAL HIP ARTHROPLASTY   05/30/2019     Hip Replacement    TOTAL KNEE ARTHROPLASTY   05/30/2019     Knee Replacement

## 2025-04-08 PROCEDURE — RXMED WILLOW AMBULATORY MEDICATION CHARGE

## 2025-04-10 ENCOUNTER — PHARMACY VISIT (OUTPATIENT)
Dept: PHARMACY | Facility: CLINIC | Age: 85
End: 2025-04-10
Payer: COMMERCIAL

## 2025-05-02 ENCOUNTER — APPOINTMENT (OUTPATIENT)
Dept: PRIMARY CARE | Facility: CLINIC | Age: 85
End: 2025-05-02
Payer: MEDICARE

## 2025-05-02 VITALS
OXYGEN SATURATION: 95 % | HEART RATE: 86 BPM | BODY MASS INDEX: 37.56 KG/M2 | WEIGHT: 220 LBS | DIASTOLIC BLOOD PRESSURE: 56 MMHG | SYSTOLIC BLOOD PRESSURE: 146 MMHG | HEIGHT: 64 IN

## 2025-05-02 DIAGNOSIS — I48.0 PAROXYSMAL ATRIAL FIBRILLATION (MULTI): ICD-10-CM

## 2025-05-02 DIAGNOSIS — E78.5 HYPERLIPIDEMIA, UNSPECIFIED HYPERLIPIDEMIA TYPE: ICD-10-CM

## 2025-05-02 DIAGNOSIS — J41.1 MUCOPURULENT CHRONIC BRONCHITIS (MULTI): ICD-10-CM

## 2025-05-02 DIAGNOSIS — I10 PRIMARY HYPERTENSION: Primary | ICD-10-CM

## 2025-05-02 DIAGNOSIS — E66.01 MORBID OBESITY (MULTI): ICD-10-CM

## 2025-05-02 DIAGNOSIS — N18.31 CHRONIC KIDNEY DISEASE, STAGE 3A (MULTI): ICD-10-CM

## 2025-05-02 DIAGNOSIS — I50.32 CHRONIC DIASTOLIC CONGESTIVE HEART FAILURE: ICD-10-CM

## 2025-05-02 LAB
25(OH)D3+25(OH)D2 SERPL-MCNC: 56 NG/ML (ref 30–100)
ALBUMIN SERPL-MCNC: 4.2 G/DL (ref 3.6–5.1)
ALP SERPL-CCNC: 74 U/L (ref 37–153)
ALT SERPL-CCNC: 12 U/L (ref 6–29)
ANION GAP SERPL CALCULATED.4IONS-SCNC: 7 MMOL/L (CALC) (ref 7–17)
AST SERPL-CCNC: 18 U/L (ref 10–35)
BILIRUB SERPL-MCNC: 0.4 MG/DL (ref 0.2–1.2)
BUN SERPL-MCNC: 31 MG/DL (ref 7–25)
CALCIUM SERPL-MCNC: 9.3 MG/DL (ref 8.6–10.4)
CHLORIDE SERPL-SCNC: 104 MMOL/L (ref 98–110)
CHOLEST SERPL-MCNC: 104 MG/DL
CHOLEST/HDLC SERPL: 1.8 (CALC)
CO2 SERPL-SCNC: 28 MMOL/L (ref 20–32)
CREAT SERPL-MCNC: 0.83 MG/DL (ref 0.6–0.95)
EGFRCR SERPLBLD CKD-EPI 2021: 69 ML/MIN/1.73M2
ERYTHROCYTE [DISTWIDTH] IN BLOOD BY AUTOMATED COUNT: 12.6 % (ref 11–15)
GLUCOSE SERPL-MCNC: 90 MG/DL (ref 65–99)
HCT VFR BLD AUTO: 28.8 % (ref 35–45)
HDLC SERPL-MCNC: 58 MG/DL
HGB BLD-MCNC: 9.1 G/DL (ref 11.7–15.5)
LDLC SERPL CALC-MCNC: 33 MG/DL (CALC)
MCH RBC QN AUTO: 28.4 PG (ref 27–33)
MCHC RBC AUTO-ENTMCNC: 31.6 G/DL (ref 32–36)
MCV RBC AUTO: 90 FL (ref 80–100)
NONHDLC SERPL-MCNC: 46 MG/DL (CALC)
PLATELET # BLD AUTO: 234 THOUSAND/UL (ref 140–400)
PMV BLD REES-ECKER: 10.7 FL (ref 7.5–12.5)
POTASSIUM SERPL-SCNC: 4.5 MMOL/L (ref 3.5–5.3)
PROT SERPL-MCNC: 6.4 G/DL (ref 6.1–8.1)
RBC # BLD AUTO: 3.2 MILLION/UL (ref 3.8–5.1)
SODIUM SERPL-SCNC: 139 MMOL/L (ref 135–146)
TRIGL SERPL-MCNC: 53 MG/DL
TSH SERPL-ACNC: 1.85 MIU/L (ref 0.4–4.5)
WBC # BLD AUTO: 7.7 THOUSAND/UL (ref 3.8–10.8)

## 2025-05-02 PROCEDURE — 1159F MED LIST DOCD IN RCRD: CPT | Performed by: INTERNAL MEDICINE

## 2025-05-02 PROCEDURE — G2211 COMPLEX E/M VISIT ADD ON: HCPCS | Performed by: INTERNAL MEDICINE

## 2025-05-02 PROCEDURE — 99214 OFFICE O/P EST MOD 30 MIN: CPT | Performed by: INTERNAL MEDICINE

## 2025-05-02 PROCEDURE — 1123F ACP DISCUSS/DSCN MKR DOCD: CPT | Performed by: INTERNAL MEDICINE

## 2025-05-02 PROCEDURE — 3077F SYST BP >= 140 MM HG: CPT | Performed by: INTERNAL MEDICINE

## 2025-05-02 PROCEDURE — 1036F TOBACCO NON-USER: CPT | Performed by: INTERNAL MEDICINE

## 2025-05-02 PROCEDURE — 3078F DIAST BP <80 MM HG: CPT | Performed by: INTERNAL MEDICINE

## 2025-05-02 RX ORDER — GUAIFENESIN 600 MG/1
600 TABLET, EXTENDED RELEASE ORAL 2 TIMES DAILY
Qty: 60 TABLET | Refills: 5 | Status: SHIPPED | OUTPATIENT
Start: 2025-05-02 | End: 2026-05-02

## 2025-05-02 RX ORDER — AZITHROMYCIN 250 MG/1
TABLET, FILM COATED ORAL
Qty: 6 TABLET | Refills: 0 | Status: SHIPPED | OUTPATIENT
Start: 2025-05-02 | End: 2025-05-07

## 2025-05-02 NOTE — PROGRESS NOTES
Patient ID: Caridad Rodriguez is a 83 y.o. female who presents for the following     Chronic disease follow up    Medicare wellness 12/17/24     Assessment/Plan      diastolic chf : on 20mg lasix daily  Currently trace to +1 edema      dry weight is 220lbs, currently patient is 220 lbs     Patient is apparently not on any lasix at all (even though I though she said she was taking 20mg and she was to take 40mg for 5 days)    Instead she doubled her hydrochlorothiazide to 25mg daily    Lets just continue hydrochlorothiazide 25mg daily     Will check bmp at next encounter in 1 month in April 2025    CKD stage 3: GFR 50s, continue hydration. Will continue to monitor through blood work.     Constipation: stable, taking lactulose daily      COPD - stable on trelgey   Responds well to the following  Prednisone 40mg x 5 day 20mg for 5 days  Azithromycin  Medications given upfront to prevent hospitalizations, patient is educated how to administer medications for her COPD exacerbation symptoms.   Mucinex 600mg bid    spirometry  FEV1 49%,          she has a nebulizer at home.   Continue Trelegy.     Needs RSV and flu shot needed still she is going to [employer]       Htn: lisinopril 40 mg  Start bystolic  5 mg (4/9/2024) --> 10mg daily (7/9/24)    morbid obesity: stable, recommend diet and weight loss     a-fib: stable, rate controlled on metoprolol    continue on xeralto reduced to 15mg daily given GFR)     GFR is up and down given lasix dosing. Precaution with xarelto advised .     Anemia: stable BACK UP 13-12     Needs RSV vaccine and covid booster up date     HPI   female with hemorrhoids, htn, hld, bilateral tka, hx sbo jan 2021 & Feb 2022, a-fib (on xarelto), diastolic CHF wants to follow up for         diastolic chf/ bilateral lower extremity edema: patient is euvolemic at this time with her dry weight being 220lbs. No edema on lower extremities, abdomen, no exertional sob    COPD: patient taking trelegy daily. Noted to  "have a productive cough most days during the week. Not currently taking mucinex. Denies any wheezing or significant sob at this time.         A-fib: patient is now on metoprolol 25mg bid and xarelto 20mg daily. echo 2021 shows normal ef with elevated RVSP. she has mild bruising on her right forearm but no bleeding from xeralto      NO BLEEDING Colonoscopy: Took cologuard at home which was positive, Coloscopy done by Dr. Peña, states she doesn't need another colonoscopy now for the rest of her life     Mammogram: 6-7 year ago, no family hx of breast cancer     patient has great granddaughters      family medical history: both mom and dad  DM and stroke, brother  lung ca      surgery: open incisional hernia repair May 17, 2022 Dr Orona      HOSPITALIZATIONS   [facility] 2022 - 2022 for small bowel obstruction that resolved within the day. months later she had her hernia repaired in May 2022.       [facility]  to 2022 for severe diarrhea. abd/pelvic ct shows SBO. labs show uti. patient improved quicker then expected. she is sent with cipro going home and she is to follow up with general surgery for hernia repair     Visit Vitals  /56 (BP Location: Left arm, Patient Position: Sitting, BP Cuff Size: Large adult)   Pulse 86   Ht 1.626 m (5' 4\")   Wt 99.8 kg (220 lb)   SpO2 95%   BMI 37.76 kg/m²   Smoking Status Former   BSA 2.12 m²        PHYSICAL EXAM     General appearance: Alert and in no acute distress. speech is clear and coherent  HEENT: Sclera and conjunctiva white, EOMI,     Respiratory : No respiratory distress, normal respiratory rhythm and effort. Clear bilateral breath sounds. No wheezing or rhonchi.   Cardiovascular: heart rate regular, S1, S2. no murmurs. Trace to +1 Lower extremity edema bilaterally   MSK: 5/5 strength upper and lower extremities without gait abnormalities. no loss of muscle mass    Abdomen: soft none tender none distended    REVIEW OF " SYSTEMS      Constitutional: not feeling tired and no fever, chills or sweats. Denies weight loss     Cardiovascular: no exertional chest pain, no palpitations,    Lungs: Denies shortness of breath, exertional dyspnea, wheezing   Musculoskeletal: no myalgias, no muscle weakness     Neurological:  no numbness, no lateralizing deficits and no fainting.   Psychiatric: no depression and no anxiety.             Allergies   Allergen Reactions    Adhesive Tape-Silicones Unknown    Latex Unknown    Metronidazole Unknown         Current Medications          Current Outpatient Medications   Medication Sig Dispense Refill    albuterol (Ventolin HFA) 90 mcg/actuation inhaler Inhale 2 puffs every 4 (four) hours if needed.        amLODIPine (Norvasc) 10 mg tablet Take 1 tablet (10 mg) by mouth 1 (one) time each day.        aspirin 81 mg capsule Take 1 tablet by mouth 1 (one) time each day.        fluticasone furoate-vilanteroL (Breo Ellipta) 100-25 mcg/dose inhaler Inhale 1 puff in the morning. After inhalation rinse mouth with water and spit. Use same time each day, no more than once in 24 hours.        furosemide (Lasix) 20 mg tablet Take 1 tablet (20 mg) by mouth 1 (one) time each day.        hydroCHLOROthiazide (HYDRODiuril) 25 mg tablet Take 1 tablet (25 mg) by mouth 1 (one) time each day.        lactulose 20 gram/30 mL oral solution Take 15 mL (10 g) by mouth every other day.        latanoprost (Xalatan) 0.005 % ophthalmic solution Latanoprost 0.005 % Ophthalmic Solution   Refills: 0         Start : 22-Apr-2019   Active  2.5 ML Bottle        lisinopril 40 mg tablet Take 1 tablet (40 mg) by mouth 1 (one) time each day.        magnesium hydroxide (Milk of Magnesia) 400 mg/5 mL suspension Milk of Magnesia 400 MG/5ML Oral Suspension   Refills: 0         Start : 19-Jan-2021   Active        metoprolol tartrate (Lopressor) 25 mg tablet Take 1 tablet (25 mg) by mouth in the morning and at bedtime.        omeprazole OTC (PriLOSEC  OTC) 20 mg EC tablet Take 1 tablet (20 mg) by mouth 1 (one) time each day. Do not crush, chew, or split. (While on steroids)        polyethylene glycol (Miralax) 17 gram/dose powder MiraLax 17 GM/SCOOP Oral Powder   Refills: 0         Start : 30-Jul-2020   Active        predniSONE (Deltasone) 20 mg tablet Take 1 tablet (20 mg) by mouth 1 (one) time each day. Take 2 tablets for 5 days, then 1 tablet for 5 days for COPD exacerbation        rivaroxaban (Xarelto) 20 mg tablet Take 1 tablet (20 mg) by mouth 1 (one) time each day.        rosuvastatin (Crestor) 20 mg tablet Take 1 tablet (20 mg) by mouth 1 (one) time each day.        tiZANidine (Zanaflex) 4 mg tablet at bedtime. 1 tab po at night before sleep        triamcinolone (Kenalog) 0.1 % cream 3 times a day. APPLY SPARINGLY TO AFFECTED AREA(S) 3 TIMES A DAY          No current facility-administered medications for this visit.                  Objective           No visits with results within 4 Month(s) from this visit.   Latest known visit with results is:   Legacy Encounter on 10/18/2022   Component Date Value Ref Range Status    WBC, Urine 10/18/2022 928 (A)  0 - 5 /HPF Final    WBC Clumps, Urine 10/18/2022 OCC  /HPF Final    RBC, Urine 10/18/2022 73 (A)  0 - 5 /HPF Final    Squamous Epithelial Cells, Urine 10/18/2022 3  /HPF Final    Bacteria, Urine 10/18/2022 2+ (A)  /HPF Final    Mucus, Urine 10/18/2022 1+  /LPF Final    Color, Urine 10/18/2022 YELLOW  STRAW,YELLOW Final    Appearance, Urine 10/18/2022 HAZY  CLEAR Final    Specific Gravity, Urine 10/18/2022 1.011  1.005 - 1.035 Final    pH, Urine 10/18/2022 5.0  5.0 - 8.0 Final    Protein, Urine 10/18/2022 30 (1+) (A)  NEGATIVE mg/dL Final    Glucose, Urine 10/18/2022 NEGATIVE  NEGATIVE mg/dL Final    Blood, Urine 10/18/2022 LARGE (3+) (A)  NEGATIVE Final    Ketones, Urine 10/18/2022 NEGATIVE  NEGATIVE mg/dL Final    Bilirubin, Urine 10/18/2022 NEGATIVE  NEGATIVE Final    Urobilinogen, Urine 10/18/2022 <2.0   0.0 - 1.9 mg/dL Final    Nitrite, Urine 10/18/2022 NEGATIVE  NEGATIVE Final    Leukocyte Esterase, Urine 10/18/2022 LARGE (3+) (A)  NEGATIVE Final         Radiology: Reviewed imaging in powerchart.  No results found.     Family History          Family History   Problem Relation Name Age of Onset    Hypertension Mother        Stroke Mother        Hypertension Father        Stroke Father        Lung cancer Brother             Social History   Social History            Socioeconomic History    Marital status:        Spouse name: None    Number of children: None    Years of education: None    Highest education level: None   Occupational History    None   Tobacco Use    Smoking status: Former       Types: Cigarettes    Smokeless tobacco: Former   Vaping Use    Vaping status: None   Substance and Sexual Activity    Alcohol use: None    Drug use: None    Sexual activity: None   Other Topics Concern    None   Social History Narrative    None      Social Determinants of Health      Financial Resource Strain: Not on file   Food Insecurity: Not on file   Transportation Needs: Not on file   Physical Activity: Not on file   Stress: Not on file   Social Connections: Not on file   Intimate Partner Violence: Not on file   Housing Stability: Not on file         Medical History        Past Medical History:   Diagnosis Date    Diverticulitis of intestine, part unspecified, without perforation or abscess without bleeding 05/30/2019     Dvtrcli of intest, part unsp, w/o perf or abscess w/o bleed    Other conditions influencing health status 05/30/2019     Cystocele    Personal history of other diseases of the circulatory system       History of hypertension    Personal history of other diseases of the musculoskeletal system and connective tissue       History of osteoarthritis    Personal history of other diseases of the nervous system and sense organs       History of carpal tunnel syndrome    Personal history of urinary (tract)  infections 10/10/2017     History of urinary tract infection         Surgical History         Past Surgical History:   Procedure Laterality Date    APPENDECTOMY   05/30/2019     Appendectomy    CHOLECYSTECTOMY   05/30/2019     Cholecystectomy    COLON SURGERY   05/30/2019     Colon Surgery    HERNIA REPAIR   05/30/2019     Hernia Repair    LUNG SURGERY   05/30/2019     Lung Surgery    MR HEAD ANGIO WO IV CONTRAST   4/23/2016     MR HEAD ANGIO WO IV CONTRAST 4/23/2016 [facility] [place] [facility]    OTHER SURGICAL HISTORY   05/30/2019     Excision of basal cell carcinoma    OTHER SURGICAL HISTORY   05/30/2019     Hemorrhoidectomy    TONSILLECTOMY   05/30/2019     Tonsillectomy    TOTAL HIP ARTHROPLASTY   05/30/2019     Hip Replacement    TOTAL KNEE ARTHROPLASTY   05/30/2019     Knee Replacement

## 2025-05-05 ENCOUNTER — TELEPHONE (OUTPATIENT)
Dept: PRIMARY CARE | Facility: CLINIC | Age: 85
End: 2025-05-05
Payer: MEDICARE

## 2025-05-05 DIAGNOSIS — K59.00 CONSTIPATION, UNSPECIFIED CONSTIPATION TYPE: ICD-10-CM

## 2025-05-05 RX ORDER — LACTULOSE 10 G/15ML
10 SOLUTION ORAL; RECTAL 2 TIMES DAILY
Qty: 237 ML | Refills: 1 | Status: SHIPPED | OUTPATIENT
Start: 2025-05-05

## 2025-05-08 ENCOUNTER — TELEPHONE (OUTPATIENT)
Dept: PRIMARY CARE | Facility: CLINIC | Age: 85
End: 2025-05-08
Payer: MEDICARE

## 2025-05-08 DIAGNOSIS — D64.9 ANEMIA, UNSPECIFIED TYPE: Primary | ICD-10-CM

## 2025-05-08 NOTE — TELEPHONE ENCOUNTER
----- Message from Tyrell Marlow sent at 5/8/2025  6:53 AM EDT -----  Patients hemoglobin has dropped to 9.1, recommend taking 325mg iron supplements . Repeat blood work today. Follow up with me on Monday.     I placed more blood work for patient to get. #If she notes active bleeding she will need to to the emergency department. Please follow up with GI for anemia   ----- Message -----  From: [facility] Results In  Sent: 5/2/2025   9:59 PM EDT  To: Tyrell Marlow, DO

## 2025-05-09 ENCOUNTER — LAB (OUTPATIENT)
Dept: LAB | Facility: HOSPITAL | Age: 85
End: 2025-05-09
Payer: MEDICARE

## 2025-05-09 LAB
ERYTHROCYTE [DISTWIDTH] IN BLOOD BY AUTOMATED COUNT: 12.7 % (ref 11–15)
FERRITIN SERPL-MCNC: 41 NG/ML (ref 16–288)
FOLATE SERPL-MCNC: 11.8 NG/ML
HCT VFR BLD AUTO: 28.4 % (ref 35–45)
HGB BLD-MCNC: 9 G/DL (ref 11.7–15.5)
IRON SATN MFR SERPL: 7 % (CALC) (ref 16–45)
IRON SERPL-MCNC: 31 MCG/DL (ref 45–160)
MCH RBC QN AUTO: 27.9 PG (ref 27–33)
MCHC RBC AUTO-ENTMCNC: 31.7 G/DL (ref 32–36)
MCV RBC AUTO: 87.9 FL (ref 80–100)
PLATELET # BLD AUTO: 218 THOUSAND/UL (ref 140–400)
PMV BLD REES-ECKER: 10.7 FL (ref 7.5–12.5)
PROT SERPL-MCNC: 6.4 G/DL (ref 6.4–8.2)
RBC # BLD AUTO: 3.23 MILLION/UL (ref 3.8–5.1)
TIBC SERPL-MCNC: 464 MCG/DL (CALC) (ref 250–450)
VIT B12 SERPL-MCNC: 339 PG/ML (ref 200–1100)
WBC # BLD AUTO: 6.3 THOUSAND/UL (ref 3.8–10.8)

## 2025-05-09 PROCEDURE — 83521 IG LIGHT CHAINS FREE EACH: CPT

## 2025-05-09 PROCEDURE — 84165 PROTEIN E-PHORESIS SERUM: CPT

## 2025-05-09 PROCEDURE — 84155 ASSAY OF PROTEIN SERUM: CPT

## 2025-05-09 PROCEDURE — 86334 IMMUNOFIX E-PHORESIS SERUM: CPT

## 2025-05-10 LAB
KAPPA LC SERPL-MCNC: 2.82 MG/DL (ref 0.33–1.94)
KAPPA LC/LAMBDA SER: 1.52 {RATIO} (ref 0.26–1.65)
LAMBDA LC SERPL-MCNC: 1.85 MG/DL (ref 0.57–2.63)

## 2025-05-12 ENCOUNTER — TELEPHONE (OUTPATIENT)
Dept: PRIMARY CARE | Facility: CLINIC | Age: 85
End: 2025-05-12

## 2025-05-12 ENCOUNTER — APPOINTMENT (OUTPATIENT)
Dept: PRIMARY CARE | Facility: CLINIC | Age: 85
End: 2025-05-12
Payer: MEDICARE

## 2025-05-12 VITALS
SYSTOLIC BLOOD PRESSURE: 180 MMHG | BODY MASS INDEX: 37.56 KG/M2 | OXYGEN SATURATION: 98 % | HEIGHT: 64 IN | HEART RATE: 64 BPM | WEIGHT: 220 LBS | DIASTOLIC BLOOD PRESSURE: 83 MMHG

## 2025-05-12 DIAGNOSIS — I48.0 PAROXYSMAL ATRIAL FIBRILLATION (MULTI): ICD-10-CM

## 2025-05-12 DIAGNOSIS — I10 PRIMARY HYPERTENSION: ICD-10-CM

## 2025-05-12 DIAGNOSIS — I50.32 CHRONIC DIASTOLIC CONGESTIVE HEART FAILURE: Primary | ICD-10-CM

## 2025-05-12 DIAGNOSIS — D64.9 ANEMIA, UNSPECIFIED TYPE: ICD-10-CM

## 2025-05-12 PROCEDURE — G2211 COMPLEX E/M VISIT ADD ON: HCPCS | Performed by: INTERNAL MEDICINE

## 2025-05-12 PROCEDURE — 3077F SYST BP >= 140 MM HG: CPT | Performed by: INTERNAL MEDICINE

## 2025-05-12 PROCEDURE — 1036F TOBACCO NON-USER: CPT | Performed by: INTERNAL MEDICINE

## 2025-05-12 PROCEDURE — 99214 OFFICE O/P EST MOD 30 MIN: CPT | Performed by: INTERNAL MEDICINE

## 2025-05-12 PROCEDURE — 1159F MED LIST DOCD IN RCRD: CPT | Performed by: INTERNAL MEDICINE

## 2025-05-12 PROCEDURE — 3079F DIAST BP 80-89 MM HG: CPT | Performed by: INTERNAL MEDICINE

## 2025-05-12 RX ORDER — AMLODIPINE BESYLATE 5 MG/1
5 TABLET ORAL DAILY
Qty: 90 TABLET | Refills: 3 | Status: SHIPPED | OUTPATIENT
Start: 2025-05-12 | End: 2026-05-07

## 2025-05-12 NOTE — TELEPHONE ENCOUNTER
PT called do schedule a colonoscopy and the soonest the PT was to get a consult would be July 31st.  Said if this were to happen to call Dr Chicas back and he may be able to get PT in sooner.

## 2025-05-12 NOTE — PROGRESS NOTES
Patient ID: Caridad Rodriguez is a 83 y.o. female who presents for the following     Chronic disease follow up    Medicare wellness 12/17/24     Assessment/Plan    a-fib: stable, rate controlled on metoprolol    continue on xeralto reduced to 15mg daily given GFR) -STOPPED 5/12/25 GIVEN low hemoglobin     GFR is up and down given lasix dosing. Precaution with xarelto advised .     Htn: unstalbe,  lisinopril 40 mg  bystolic  10mg daily   25mg hydrochlorothiazide daily  Start amlodipine 5mg daily      Anemia: stable BACK UP 13-12--> 9s  Stop xarelto  Follow up with dr richard  Cbc in 2 weeks   Iron tablet 325mg daily   Follow up with me 2 months       diastolic chf : on 20mg lasix daily  Currently trace to +1 edema      dry weight is 220lbs, currently patient is 220 lbs     Patient is apparently not on any lasix at all (even though I though she said she was taking 20mg and she was to take 40mg for 5 days)    Instead she doubled her hydrochlorothiazide to 25mg daily    Lets just continue hydrochlorothiazide 25mg daily     Will check bmp at next encounter in 1 month in April 2025    CKD stage 3: GFR 50s, continue hydration. Will continue to monitor through blood work.     Constipation: stable, taking lactulose daily      COPD - stable on trelgey   Responds well to the following  Prednisone 40mg x 5 day 20mg for 5 days  Azithromycin  Medications given upfront to prevent hospitalizations, patient is educated how to administer medications for her COPD exacerbation symptoms.   Mucinex 600mg bid    spirometry  FEV1 49%,          she has a nebulizer at home.   Continue Trelegy.     Needs RSV and flu shot needed still she is going to [employer]       morbid obesity: stable, recommend diet and weight loss     Needs RSV vaccine and covid booster up date     HPI   female with hemorrhoids, htn, hld, bilateral tka, hx sbo jan 2021 & Feb 2022, a-fib (on xarelto), diastolic CHF wants to follow up for      HTN: patient denies any headaches,  "blurred vision or dizziness. patient denies any stroke like symptoms    Anemia: patient hgb 9s and consistent. She is not on iron supplements. No obvious sources to bleeding at this time. Denies any lightheadedness.      diastolic chf/ bilateral lower extremity edema: patient is euvolemic at this time with her dry weight being 220lbs. No edema on lower extremities, abdomen, no exertional sob    COPD: patient taking trelegy daily. Noted to have a productive cough most days during the week. Not currently taking mucinex. Denies any wheezing or significant sob at this time.         A-fib: patient is now on metoprolol 25mg bid and xarelto  . echo 2021 shows normal ef with elevated RVSP. she has mild bruising on her right forearm       NO BLEEDING Colonoscopy: Took cologuard at home which was positive, Coloscopy done by Dr. Peña, states she doesn't need another colonoscopy now for the rest of her life     Mammogram: 6-7 year ago, no family hx of breast cancer     patient has great granddaughters      family medical history: both mom and dad  DM and stroke, brother  lung ca      surgery: open incisional hernia repair May 17, 2022 Dr Orona      HOSPITALIZATIONS   [facility] 2022 - 2022 for small bowel obstruction that resolved within the day. months later she had her hernia repaired in May 2022.       [facility]  to 2022 for severe diarrhea. abd/pelvic ct shows SBO. labs show uti. patient improved quicker then expected. she is sent with cipro going home and she is to follow up with general surgery for hernia repair     Visit Vitals  /83 (BP Location: Right arm, Patient Position: Sitting, BP Cuff Size: Large adult)   Pulse 64   Ht 1.626 m (5' 4\")   Wt 99.8 kg (220 lb)   SpO2 98%   BMI 37.76 kg/m²   Smoking Status Former   BSA 2.12 m²        PHYSICAL EXAM     General appearance: Alert and in no acute distress. speech is clear and coherent  HEENT: Sclera and conjunctiva white, " EOMI,     Respiratory : No respiratory distress, normal respiratory rhythm and effort. Clear bilateral breath sounds. No wheezing or rhonchi.   Cardiovascular: heart rate regular, S1, S2. no murmurs. Trace to +1 Lower extremity edema bilaterally   MSK: 5/5 strength upper and lower extremities without gait abnormalities. no loss of muscle mass    Abdomen: soft none tender none distended    REVIEW OF SYSTEMS      Constitutional: not feeling tired and no fever, chills or sweats. Denies weight loss     Cardiovascular: no exertional chest pain, no palpitations,    Lungs: Denies shortness of breath, exertional dyspnea, wheezing   Musculoskeletal: no myalgias, no muscle weakness     Neurological:  no numbness, no lateralizing deficits and no fainting.   Psychiatric: no depression and no anxiety.             Allergies   Allergen Reactions    Adhesive Tape-Silicones Unknown    Latex Unknown    Metronidazole Unknown         Current Medications          Current Outpatient Medications   Medication Sig Dispense Refill    albuterol (Ventolin HFA) 90 mcg/actuation inhaler Inhale 2 puffs every 4 (four) hours if needed.        amLODIPine (Norvasc) 10 mg tablet Take 1 tablet (10 mg) by mouth 1 (one) time each day.        aspirin 81 mg capsule Take 1 tablet by mouth 1 (one) time each day.        fluticasone furoate-vilanteroL (Breo Ellipta) 100-25 mcg/dose inhaler Inhale 1 puff in the morning. After inhalation rinse mouth with water and spit. Use same time each day, no more than once in 24 hours.        furosemide (Lasix) 20 mg tablet Take 1 tablet (20 mg) by mouth 1 (one) time each day.        hydroCHLOROthiazide (HYDRODiuril) 25 mg tablet Take 1 tablet (25 mg) by mouth 1 (one) time each day.        lactulose 20 gram/30 mL oral solution Take 15 mL (10 g) by mouth every other day.        latanoprost (Xalatan) 0.005 % ophthalmic solution Latanoprost 0.005 % Ophthalmic Solution   Refills: 0         Start : 22-Apr-2019   Active  2.5 ML  Bottle        lisinopril 40 mg tablet Take 1 tablet (40 mg) by mouth 1 (one) time each day.        magnesium hydroxide (Milk of Magnesia) 400 mg/5 mL suspension Milk of Magnesia 400 MG/5ML Oral Suspension   Refills: 0         Start : 19-Jan-2021   Active        metoprolol tartrate (Lopressor) 25 mg tablet Take 1 tablet (25 mg) by mouth in the morning and at bedtime.        omeprazole OTC (PriLOSEC OTC) 20 mg EC tablet Take 1 tablet (20 mg) by mouth 1 (one) time each day. Do not crush, chew, or split. (While on steroids)        polyethylene glycol (Miralax) 17 gram/dose powder MiraLax 17 GM/SCOOP Oral Powder   Refills: 0         Start : 30-Jul-2020   Active        predniSONE (Deltasone) 20 mg tablet Take 1 tablet (20 mg) by mouth 1 (one) time each day. Take 2 tablets for 5 days, then 1 tablet for 5 days for COPD exacerbation        rivaroxaban (Xarelto) 20 mg tablet Take 1 tablet (20 mg) by mouth 1 (one) time each day.        rosuvastatin (Crestor) 20 mg tablet Take 1 tablet (20 mg) by mouth 1 (one) time each day.        tiZANidine (Zanaflex) 4 mg tablet at bedtime. 1 tab po at night before sleep        triamcinolone (Kenalog) 0.1 % cream 3 times a day. APPLY SPARINGLY TO AFFECTED AREA(S) 3 TIMES A DAY          No current facility-administered medications for this visit.                  Objective           No visits with results within 4 Month(s) from this visit.   Latest known visit with results is:   Legacy Encounter on 10/18/2022   Component Date Value Ref Range Status    WBC, Urine 10/18/2022 928 (A)  0 - 5 /HPF Final    WBC Clumps, Urine 10/18/2022 OCC  /HPF Final    RBC, Urine 10/18/2022 73 (A)  0 - 5 /HPF Final    Squamous Epithelial Cells, Urine 10/18/2022 3  /HPF Final    Bacteria, Urine 10/18/2022 2+ (A)  /HPF Final    Mucus, Urine 10/18/2022 1+  /LPF Final    Color, Urine 10/18/2022 YELLOW  STRAW,YELLOW Final    Appearance, Urine 10/18/2022 HAZY  CLEAR Final    Specific Gravity, Urine 10/18/2022 1.011   1.005 - 1.035 Final    pH, Urine 10/18/2022 5.0  5.0 - 8.0 Final    Protein, Urine 10/18/2022 30 (1+) (A)  NEGATIVE mg/dL Final    Glucose, Urine 10/18/2022 NEGATIVE  NEGATIVE mg/dL Final    Blood, Urine 10/18/2022 LARGE (3+) (A)  NEGATIVE Final    Ketones, Urine 10/18/2022 NEGATIVE  NEGATIVE mg/dL Final    Bilirubin, Urine 10/18/2022 NEGATIVE  NEGATIVE Final    Urobilinogen, Urine 10/18/2022 <2.0  0.0 - 1.9 mg/dL Final    Nitrite, Urine 10/18/2022 NEGATIVE  NEGATIVE Final    Leukocyte Esterase, Urine 10/18/2022 LARGE (3+) (A)  NEGATIVE Final         Radiology: Reviewed imaging in powerchart.  No results found.     Family History          Family History   Problem Relation Name Age of Onset    Hypertension Mother        Stroke Mother        Hypertension Father        Stroke Father        Lung cancer Brother             Social History   Social History            Socioeconomic History    Marital status:        Spouse name: None    Number of children: None    Years of education: None    Highest education level: None   Occupational History    None   Tobacco Use    Smoking status: Former       Types: Cigarettes    Smokeless tobacco: Former   Vaping Use    Vaping status: None   Substance and Sexual Activity    Alcohol use: None    Drug use: None    Sexual activity: None   Other Topics Concern    None   Social History Narrative    None      Social Determinants of Health      Financial Resource Strain: Not on file   Food Insecurity: Not on file   Transportation Needs: Not on file   Physical Activity: Not on file   Stress: Not on file   Social Connections: Not on file   Intimate Partner Violence: Not on file   Housing Stability: Not on file         Medical History        Past Medical History:   Diagnosis Date    Diverticulitis of intestine, part unspecified, without perforation or abscess without bleeding 05/30/2019     Dvtrcli of intest, part unsp, w/o perf or abscess w/o bleed    Other conditions influencing health  status 05/30/2019     Cystocele    Personal history of other diseases of the circulatory system       History of hypertension    Personal history of other diseases of the musculoskeletal system and connective tissue       History of osteoarthritis    Personal history of other diseases of the nervous system and sense organs       History of carpal tunnel syndrome    Personal history of urinary (tract) infections 10/10/2017     History of urinary tract infection         Surgical History         Past Surgical History:   Procedure Laterality Date    APPENDECTOMY   05/30/2019     Appendectomy    CHOLECYSTECTOMY   05/30/2019     Cholecystectomy    COLON SURGERY   05/30/2019     Colon Surgery    HERNIA REPAIR   05/30/2019     Hernia Repair    LUNG SURGERY   05/30/2019     Lung Surgery    MR HEAD ANGIO WO IV CONTRAST   4/23/2016     MR HEAD ANGIO WO IV CONTRAST 4/23/2016 [facility] [place] INPATIENT LEGACY    OTHER SURGICAL HISTORY   05/30/2019     Excision of basal cell carcinoma    OTHER SURGICAL HISTORY   05/30/2019     Hemorrhoidectomy    TONSILLECTOMY   05/30/2019     Tonsillectomy    TOTAL HIP ARTHROPLASTY   05/30/2019     Hip Replacement    TOTAL KNEE ARTHROPLASTY   05/30/2019     Knee Replacement

## 2025-05-14 LAB
ALBUMIN: 3.8 G/DL (ref 3.4–5)
ALPHA 1 GLOBULIN: 0.3 G/DL (ref 0.2–0.6)
ALPHA 2 GLOBULIN: 0.8 G/DL (ref 0.4–1.1)
BETA GLOBULIN: 0.8 G/DL (ref 0.5–1.2)
GAMMA GLOBULIN: 0.6 G/DL (ref 0.5–1.4)
IMMUNOFIXATION COMMENT: NORMAL
PATH REVIEW - SERUM IMMUNOFIXATION: NORMAL
PATH REVIEW-SERUM PROTEIN ELECTROPHORESIS: NORMAL
PROTEIN ELECTROPHORESIS COMMENT: NORMAL

## 2025-05-28 LAB — FOLATE SERPL-MCNC: 9.1 NG/ML

## 2025-05-29 PROCEDURE — RXMED WILLOW AMBULATORY MEDICATION CHARGE

## 2025-05-30 ENCOUNTER — PHARMACY VISIT (OUTPATIENT)
Dept: PHARMACY | Facility: CLINIC | Age: 85
End: 2025-05-30
Payer: COMMERCIAL

## 2025-06-16 ENCOUNTER — TELEPHONE (OUTPATIENT)
Dept: PRIMARY CARE | Facility: CLINIC | Age: 85
End: 2025-06-16
Payer: MEDICARE

## 2025-06-16 DIAGNOSIS — M62.838 MUSCLE SPASM: ICD-10-CM

## 2025-06-16 RX ORDER — TIZANIDINE 4 MG/1
4 TABLET ORAL NIGHTLY
Qty: 90 TABLET | Refills: 3 | Status: SHIPPED | OUTPATIENT
Start: 2025-06-16

## 2025-06-24 ENCOUNTER — TELEPHONE (OUTPATIENT)
Dept: PRIMARY CARE | Facility: CLINIC | Age: 85
End: 2025-06-24
Payer: MEDICARE

## 2025-06-24 NOTE — TELEPHONE ENCOUNTER
Pt had her endoscopy and colonoscopy done and was wondering if you wanted her to start iron supplement again and she was wondering when she should start her xarelto

## 2025-07-07 ENCOUNTER — TELEPHONE (OUTPATIENT)
Dept: PRIMARY CARE | Facility: CLINIC | Age: 85
End: 2025-07-07
Payer: MEDICARE

## 2025-07-07 DIAGNOSIS — I10 PRIMARY HYPERTENSION: ICD-10-CM

## 2025-07-07 PROCEDURE — RXMED WILLOW AMBULATORY MEDICATION CHARGE

## 2025-07-07 RX ORDER — NEBIVOLOL 10 MG/1
10 TABLET ORAL DAILY
Qty: 90 TABLET | Refills: 3 | Status: SHIPPED | OUTPATIENT
Start: 2025-07-07 | End: 2026-07-07

## 2025-07-09 ENCOUNTER — PHARMACY VISIT (OUTPATIENT)
Dept: PHARMACY | Facility: CLINIC | Age: 85
End: 2025-07-09
Payer: COMMERCIAL

## 2025-07-23 ENCOUNTER — APPOINTMENT (OUTPATIENT)
Dept: PRIMARY CARE | Facility: CLINIC | Age: 85
End: 2025-07-23
Payer: MEDICARE

## 2025-07-23 VITALS
HEIGHT: 64 IN | SYSTOLIC BLOOD PRESSURE: 148 MMHG | BODY MASS INDEX: 37.56 KG/M2 | OXYGEN SATURATION: 95 % | HEART RATE: 68 BPM | DIASTOLIC BLOOD PRESSURE: 72 MMHG | WEIGHT: 220 LBS

## 2025-07-23 DIAGNOSIS — I10 PRIMARY HYPERTENSION: ICD-10-CM

## 2025-07-23 DIAGNOSIS — I50.32 CHRONIC DIASTOLIC CONGESTIVE HEART FAILURE: Primary | ICD-10-CM

## 2025-07-23 DIAGNOSIS — D64.9 ANEMIA, UNSPECIFIED TYPE: ICD-10-CM

## 2025-07-23 DIAGNOSIS — I48.0 PAROXYSMAL ATRIAL FIBRILLATION (MULTI): ICD-10-CM

## 2025-07-23 DIAGNOSIS — E78.5 HYPERLIPIDEMIA, UNSPECIFIED HYPERLIPIDEMIA TYPE: ICD-10-CM

## 2025-07-23 PROCEDURE — G2211 COMPLEX E/M VISIT ADD ON: HCPCS | Performed by: INTERNAL MEDICINE

## 2025-07-23 PROCEDURE — 3077F SYST BP >= 140 MM HG: CPT | Performed by: INTERNAL MEDICINE

## 2025-07-23 PROCEDURE — 3078F DIAST BP <80 MM HG: CPT | Performed by: INTERNAL MEDICINE

## 2025-07-23 PROCEDURE — 1159F MED LIST DOCD IN RCRD: CPT | Performed by: INTERNAL MEDICINE

## 2025-07-23 PROCEDURE — 99214 OFFICE O/P EST MOD 30 MIN: CPT | Performed by: INTERNAL MEDICINE

## 2025-07-23 NOTE — PROGRESS NOTES
Patient ID: Caridad Rodriguez is a 83 y.o. female who presents for the following     Chronic disease follow up    Medicare wellness 12/17/24     Assessment/Plan    a-fib: stable, rate controlled on metoprolol    continue on xeralto reduced to 15mg daily given GFR) -STOPPED 5/12/25 GIVEN low hemoglobin     GFR is up and down given lasix dosing. Precaution with xarelto advised .     Htn: unstable,  lisinopril 40 mg  bystolic  10mg daily   25mg hydrochlorothiazide daily   amlodipine 5mg daily      Anemia: stable BACK UP 13-12--> 9s    Xarelto is currently stopped until hgb trends upward  Follow up with dr richard  S/p 7/15/25EGD- mild gastritis  and colonscopy negative with 5 year follow up     Currently taking Iron tablet 325mg daily (for months now, 7/23/25)  Follow up with me 2 months       diastolic chf : on 20mg lasix daily    trace to +1 edema --> trace currently 7/23/25     dry weight is 220lbs, currently patient is 220 lbs     Patient is apparently not on any lasix at all (even though I though she said she was taking 20mg and she was to take 40mg for 5 days)    Instead she doubled her hydrochlorothiazide to 25mg daily    Lets just continue hydrochlorothiazide 25mg daily     CKD stage 3: GFR 50s, continue hydration. Will continue to monitor through blood work.     Constipation: stable, taking lactulose daily      COPD - stable on trelgey   Responds well to the following  Prednisone 40mg x 5 day 20mg for 5 days  Azithromycin  Medications given upfront to prevent hospitalizations, patient is educated how to administer medications for her COPD exacerbation symptoms.   Mucinex 600mg bid    spirometry  FEV1 49%,          she has a nebulizer at home.   Continue Trelegy.     Needs RSV and flu shot needed still she is going to [email or web address]       morbid obesity: stable, recommend diet and weight loss     Needs RSV vaccine and covid booster up date     HPI   female with hemorrhoids, htn, hld, bilateral tka, hx sbo jan 2021 & Feb  ", a-fib (on xarelto), diastolic CHF wants to follow up for      HTN: patient denies any headaches, blurred vision or dizziness. patient denies any stroke like symptoms    Anemia: patient hgb 9s and consistent. She is not on iron supplements. No obvious sources to bleeding at this time. Denies any lightheadedness.      diastolic chf/ bilateral lower extremity edema: patient is euvolemic at this time with her dry weight being 220lbs. No edema on lower extremities, abdomen, no exertional sob    COPD: patient taking trelegy daily. Noted to have a productive cough most days during the week. Not currently taking mucinex. Denies any wheezing or significant sob at this time.         A-fib: patient is now on metoprolol 25mg bid and xarelto  . echo 2021 shows normal ef with elevated RVSP. she has mild bruising on her right forearm       NO BLEEDING Colonoscopy: Took cologuard at home which was positive, Coloscopy done by Dr. Peña, states she doesn't need another colonoscopy now for the rest of her life     Mammogram: 6-7 year ago, no family hx of breast cancer     patient has great granddaughters      family medical history: both mom and dad  DM and stroke, brother  lung ca      surgery: open incisional hernia repair May 17, 2022 Dr Orona      HOSPITALIZATIONS   [facility] 2022 - 2022 for small bowel obstruction that resolved within the day. months later she had her hernia repaired in May 2022.       [facility]  to 2022 for severe diarrhea. abd/pelvic ct shows SBO. labs show uti. patient improved quicker then expected. she is sent with cipro going home and she is to follow up with general surgery for hernia repair     Visit Vitals  /72 (BP Location: Right arm, Patient Position: Sitting, BP Cuff Size: Adult)   Pulse 68   Ht 1.626 m (5' 4\")   Wt 99.8 kg (220 lb)   SpO2 95%   BMI 37.76 kg/m²   Smoking Status Former   BSA 2.12 m²        PHYSICAL EXAM     General appearance: " Alert and in no acute distress. speech is clear and coherent  HEENT: Sclera and conjunctiva white, EOMI,     Respiratory : No respiratory distress, normal respiratory rhythm and effort. Clear bilateral breath sounds. No wheezing or rhonchi.   Cardiovascular: heart rate regular, S1, S2. no murmurs. Trace to +1 Lower extremity edema bilaterally   MSK: 5/5 strength upper and lower extremities without gait abnormalities. no loss of muscle mass    Abdomen: soft none tender none distended    REVIEW OF SYSTEMS      Constitutional: not feeling tired and no fever, chills or sweats. Denies weight loss     Cardiovascular: no exertional chest pain, no palpitations,    Lungs: Denies shortness of breath, exertional dyspnea, wheezing   Musculoskeletal: no myalgias, no muscle weakness     Neurological:  no numbness, no lateralizing deficits and no fainting.   Psychiatric: no depression and no anxiety.             Allergies   Allergen Reactions    Adhesive Tape-Silicones Unknown    Latex Unknown    Metronidazole Unknown         Current Medications          Current Outpatient Medications   Medication Sig Dispense Refill    albuterol (Ventolin HFA) 90 mcg/actuation inhaler Inhale 2 puffs every 4 (four) hours if needed.        amLODIPine (Norvasc) 10 mg tablet Take 1 tablet (10 mg) by mouth 1 (one) time each day.        aspirin 81 mg capsule Take 1 tablet by mouth 1 (one) time each day.        fluticasone furoate-vilanteroL (Breo Ellipta) 100-25 mcg/dose inhaler Inhale 1 puff in the morning. After inhalation rinse mouth with water and spit. Use same time each day, no more than once in 24 hours.        furosemide (Lasix) 20 mg tablet Take 1 tablet (20 mg) by mouth 1 (one) time each day.        hydroCHLOROthiazide (HYDRODiuril) 25 mg tablet Take 1 tablet (25 mg) by mouth 1 (one) time each day.        lactulose 20 gram/30 mL oral solution Take 15 mL (10 g) by mouth every other day.        latanoprost (Xalatan) 0.005 % ophthalmic solution  Latanoprost 0.005 % Ophthalmic Solution   Refills: 0         Start : 22-Apr-2019   Active  2.5 ML Bottle        lisinopril 40 mg tablet Take 1 tablet (40 mg) by mouth 1 (one) time each day.        magnesium hydroxide (Milk of Magnesia) 400 mg/5 mL suspension Milk of Magnesia 400 MG/5ML Oral Suspension   Refills: 0         Start : 19-Jan-2021   Active        metoprolol tartrate (Lopressor) 25 mg tablet Take 1 tablet (25 mg) by mouth in the morning and at bedtime.        omeprazole OTC (PriLOSEC OTC) 20 mg EC tablet Take 1 tablet (20 mg) by mouth 1 (one) time each day. Do not crush, chew, or split. (While on steroids)        polyethylene glycol (Miralax) 17 gram/dose powder MiraLax 17 GM/SCOOP Oral Powder   Refills: 0         Start : 30-Jul-2020   Active        predniSONE (Deltasone) 20 mg tablet Take 1 tablet (20 mg) by mouth 1 (one) time each day. Take 2 tablets for 5 days, then 1 tablet for 5 days for COPD exacerbation        rivaroxaban (Xarelto) 20 mg tablet Take 1 tablet (20 mg) by mouth 1 (one) time each day.        rosuvastatin (Crestor) 20 mg tablet Take 1 tablet (20 mg) by mouth 1 (one) time each day.        tiZANidine (Zanaflex) 4 mg tablet at bedtime. 1 tab po at night before sleep        triamcinolone (Kenalog) 0.1 % cream 3 times a day. APPLY SPARINGLY TO AFFECTED AREA(S) 3 TIMES A DAY          No current facility-administered medications for this visit.                  Objective           No visits with results within 4 Month(s) from this visit.   Latest known visit with results is:   Legacy Encounter on 10/18/2022   Component Date Value Ref Range Status    WBC, Urine 10/18/2022 928 (A)  0 - 5 /HPF Final    WBC Clumps, Urine 10/18/2022 OCC  /HPF Final    RBC, Urine 10/18/2022 73 (A)  0 - 5 /HPF Final    Squamous Epithelial Cells, Urine 10/18/2022 3  /HPF Final    Bacteria, Urine 10/18/2022 2+ (A)  /HPF Final    Mucus, Urine 10/18/2022 1+  /LPF Final    Color, Urine 10/18/2022 YELLOW  STRAW,YELLOW Final     Appearance, Urine 10/18/2022 HAZY  CLEAR Final    Specific Gravity, Urine 10/18/2022 1.011  1.005 - 1.035 Final    pH, Urine 10/18/2022 5.0  5.0 - 8.0 Final    Protein, Urine 10/18/2022 30 (1+) (A)  NEGATIVE mg/dL Final    Glucose, Urine 10/18/2022 NEGATIVE  NEGATIVE mg/dL Final    Blood, Urine 10/18/2022 LARGE (3+) (A)  NEGATIVE Final    Ketones, Urine 10/18/2022 NEGATIVE  NEGATIVE mg/dL Final    Bilirubin, Urine 10/18/2022 NEGATIVE  NEGATIVE Final    Urobilinogen, Urine 10/18/2022 <2.0  0.0 - 1.9 mg/dL Final    Nitrite, Urine 10/18/2022 NEGATIVE  NEGATIVE Final    Leukocyte Esterase, Urine 10/18/2022 LARGE (3+) (A)  NEGATIVE Final         Radiology: Reviewed imaging in powerchart.  No results found.     Family History          Family History   Problem Relation Name Age of Onset    Hypertension Mother        Stroke Mother        Hypertension Father        Stroke Father        Lung cancer Brother             Social History   Social History            Socioeconomic History    Marital status:        Spouse name: None    Number of children: None    Years of education: None    Highest education level: None   Occupational History    None   Tobacco Use    Smoking status: Former       Types: Cigarettes    Smokeless tobacco: Former   Vaping Use    Vaping status: None   Substance and Sexual Activity    Alcohol use: None    Drug use: None    Sexual activity: None   Other Topics Concern    None   Social History Narrative    None      Social Determinants of Health      Financial Resource Strain: Not on file   Food Insecurity: Not on file   Transportation Needs: Not on file   Physical Activity: Not on file   Stress: Not on file   Social Connections: Not on file   Intimate Partner Violence: Not on file   Housing Stability: Not on file         Medical History        Past Medical History:   Diagnosis Date    Diverticulitis of intestine, part unspecified, without perforation or abscess without bleeding 05/30/2019      Dvtrcli of intest, part unsp, w/o perf or abscess w/o bleed    Other conditions influencing health status 05/30/2019     Cystocele    Personal history of other diseases of the circulatory system       History of hypertension    Personal history of other diseases of the musculoskeletal system and connective tissue       History of osteoarthritis    Personal history of other diseases of the nervous system and sense organs       History of carpal tunnel syndrome    Personal history of urinary (tract) infections 10/10/2017     History of urinary tract infection         Surgical History         Past Surgical History:   Procedure Laterality Date    APPENDECTOMY   05/30/2019     Appendectomy    CHOLECYSTECTOMY   05/30/2019     Cholecystectomy    COLON SURGERY   05/30/2019     Colon Surgery    HERNIA REPAIR   05/30/2019     Hernia Repair    LUNG SURGERY   05/30/2019     Lung Surgery    MR HEAD ANGIO WO IV CONTRAST   4/23/2016     MR HEAD ANGIO WO IV CONTRAST 4/23/2016 [facility] [place] [facility]PATIENT LEGACY    OTHER SURGICAL HISTORY   05/30/2019     Excision of basal cell carcinoma    OTHER SURGICAL HISTORY   05/30/2019     Hemorrhoidectomy    TONSILLECTOMY   05/30/2019     Tonsillectomy    TOTAL HIP ARTHROPLASTY   05/30/2019     Hip Replacement    TOTAL KNEE ARTHROPLASTY   05/30/2019     Knee Replacement

## 2025-07-24 LAB
ANION GAP SERPL CALCULATED.4IONS-SCNC: 13 MMOL/L (CALC) (ref 7–17)
BUN SERPL-MCNC: 21 MG/DL (ref 7–25)
BUN/CREAT SERPL: NORMAL (CALC) (ref 6–22)
CALCIUM SERPL-MCNC: 9.8 MG/DL (ref 8.6–10.4)
CHLORIDE SERPL-SCNC: 103 MMOL/L (ref 98–110)
CO2 SERPL-SCNC: 24 MMOL/L (ref 20–32)
CREAT SERPL-MCNC: 0.85 MG/DL (ref 0.6–0.95)
EGFRCR SERPLBLD CKD-EPI 2021: 68 ML/MIN/1.73M2
ERYTHROCYTE [DISTWIDTH] IN BLOOD BY AUTOMATED COUNT: 14.2 % (ref 11–15)
FERRITIN SERPL-MCNC: 72 NG/ML (ref 16–288)
GLUCOSE SERPL-MCNC: 76 MG/DL (ref 65–139)
HCT VFR BLD AUTO: 34.2 % (ref 35–45)
HGB BLD-MCNC: 10.9 G/DL (ref 11.7–15.5)
IRON SATN MFR SERPL: 32 % (CALC) (ref 16–45)
IRON SERPL-MCNC: 120 MCG/DL (ref 45–160)
MCH RBC QN AUTO: 29.8 PG (ref 27–33)
MCHC RBC AUTO-ENTMCNC: 31.9 G/DL (ref 32–36)
MCV RBC AUTO: 93.4 FL (ref 80–100)
PLATELET # BLD AUTO: 241 THOUSAND/UL (ref 140–400)
PMV BLD REES-ECKER: 10 FL (ref 7.5–12.5)
POTASSIUM SERPL-SCNC: 4.7 MMOL/L (ref 3.5–5.3)
RBC # BLD AUTO: 3.66 MILLION/UL (ref 3.8–5.1)
SODIUM SERPL-SCNC: 140 MMOL/L (ref 135–146)
TIBC SERPL-MCNC: 377 MCG/DL (CALC) (ref 250–450)
WBC # BLD AUTO: 8.4 THOUSAND/UL (ref 3.8–10.8)

## 2025-08-01 ENCOUNTER — TELEPHONE (OUTPATIENT)
Dept: PRIMARY CARE | Facility: CLINIC | Age: 85
End: 2025-08-01
Payer: MEDICARE

## 2025-08-01 NOTE — TELEPHONE ENCOUNTER
Pt would like to know BW results. And she also saw [facility]. They wanted her to get a capsule endoscopy done. Would like Dr. Marlow opinion. Would like to know if she should start taking xeralto and should she stop taking iron tablets

## 2025-08-05 ENCOUNTER — APPOINTMENT (OUTPATIENT)
Dept: PRIMARY CARE | Facility: CLINIC | Age: 85
End: 2025-08-05
Payer: MEDICARE

## 2025-08-07 ENCOUNTER — TELEPHONE (OUTPATIENT)
Dept: PRIMARY CARE | Facility: CLINIC | Age: 85
End: 2025-08-07
Payer: MEDICARE

## 2025-08-07 NOTE — TELEPHONE ENCOUNTER
----- Message from Tyrell Marlow sent at 8/7/2025  7:28 AM EDT -----  She can resume her xarelto at this time. I would not stop the iron supplements as of yet. Recommend follow up in 1 month and will recheck iron storage at that time while she is on a blood thinner to see if she her blood work is holding steady.     Capsule endoscopy is not a bad idea as your source to the bleed has not been found

## 2025-08-07 NOTE — TELEPHONE ENCOUNTER
Lvmtcb She can resume her xarelto at this time. I would not stop the iron supplements as of yet. Recommend follow up in 1 month and will recheck iron storage at that time while she is on a blood thinner to see if she her blood work is holding steady.     Capsule endoscopy is not a bad idea as your source to the bleed has not been found

## 2025-08-15 ENCOUNTER — TELEPHONE (OUTPATIENT)
Dept: PRIMARY CARE | Facility: CLINIC | Age: 85
End: 2025-08-15
Payer: MEDICARE

## 2025-08-19 DIAGNOSIS — K59.00 CONSTIPATION, UNSPECIFIED CONSTIPATION TYPE: ICD-10-CM

## 2025-08-19 DIAGNOSIS — L30.9 ECZEMA, UNSPECIFIED TYPE: ICD-10-CM

## 2025-08-19 RX ORDER — TRIAMCINOLONE ACETONIDE 1 MG/G
CREAM TOPICAL DAILY
Qty: 30 G | Refills: 2 | Status: SHIPPED | OUTPATIENT
Start: 2025-08-19 | End: 2025-08-22 | Stop reason: SDUPTHER

## 2025-08-19 RX ORDER — LACTULOSE 10 G/15ML
10 SOLUTION ORAL; RECTAL 2 TIMES DAILY
Qty: 237 ML | Refills: 1 | Status: SHIPPED | OUTPATIENT
Start: 2025-08-19

## 2025-08-22 DIAGNOSIS — L30.9 ECZEMA, UNSPECIFIED TYPE: ICD-10-CM

## 2025-08-22 RX ORDER — TRIAMCINOLONE ACETONIDE 1 MG/G
CREAM TOPICAL 3 TIMES DAILY
Qty: 30 G | Refills: 2 | Status: SHIPPED | OUTPATIENT
Start: 2025-08-22

## 2025-08-27 ENCOUNTER — PHARMACY VISIT (OUTPATIENT)
Dept: PHARMACY | Facility: CLINIC | Age: 85
End: 2025-08-27
Payer: COMMERCIAL

## 2025-08-27 PROCEDURE — RXMED WILLOW AMBULATORY MEDICATION CHARGE

## 2025-09-09 ENCOUNTER — APPOINTMENT (OUTPATIENT)
Dept: PRIMARY CARE | Facility: CLINIC | Age: 85
End: 2025-09-09
Payer: MEDICARE

## 2025-11-11 ENCOUNTER — APPOINTMENT (OUTPATIENT)
Dept: PRIMARY CARE | Facility: CLINIC | Age: 85
End: 2025-11-11
Payer: MEDICARE